# Patient Record
Sex: MALE | Race: WHITE | URBAN - NONMETROPOLITAN AREA
[De-identification: names, ages, dates, MRNs, and addresses within clinical notes are randomized per-mention and may not be internally consistent; named-entity substitution may affect disease eponyms.]

---

## 2018-03-01 ENCOUNTER — DOCTOR'S OFFICE (OUTPATIENT)
Dept: URBAN - NONMETROPOLITAN AREA CLINIC 1 | Facility: CLINIC | Age: 68
Setting detail: OPHTHALMOLOGY
End: 2018-03-01
Payer: COMMERCIAL

## 2018-03-01 DIAGNOSIS — H52.13: ICD-10-CM

## 2018-03-01 DIAGNOSIS — H52.4: ICD-10-CM

## 2018-03-01 PROCEDURE — 92004 COMPRE OPH EXAM NEW PT 1/>: CPT | Performed by: OPTOMETRIST

## 2018-03-01 ASSESSMENT — SPHEQUIV_DERIVED
OD_SPHEQUIV: -0.375
OS_SPHEQUIV: -1.25

## 2018-03-01 ASSESSMENT — REFRACTION_AUTOREFRACTION
OD_AXIS: 151
OD_SPHERE: 0.00
OS_CYLINDER: -0.50
OS_SPHERE: -1.00
OS_AXIS: 59
OD_CYLINDER: -0.75

## 2018-03-01 ASSESSMENT — VISUAL ACUITY
OS_BCVA: 20/25
OD_BCVA: 20/25-1

## 2018-03-01 ASSESSMENT — REFRACTION_CURRENTRX
OD_AXIS: 154
OD_SPHERE: -0.50
OD_CYLINDER: -1.50
OS_AXIS: 180
OS_OVR_VA: 20/
OS_VPRISM_DIRECTION: SV
OD_OVR_VA: 20/
OS_OVR_VA: 20/
OD_VPRISM_DIRECTION: SV
OD_OVR_VA: 20/
OS_SPHERE: -1.00
OS_OVR_VA: 20/
OD_OVR_VA: 20/
OS_CYLINDER: 0.00

## 2018-03-01 ASSESSMENT — REFRACTION_MANIFEST
OD_VA2: 20/
OD_VA2: 20/
OU_VA: 20/
OS_VA3: 20/
OS_VA2: 20/
OS_VA3: 20/
OD_VA1: 20/
OU_VA: 20/
OD_VA1: 20/
OD_VA3: 20/
OD_VA3: 20/
OS_VA1: 20/
OS_VA2: 20/
OS_VA1: 20/

## 2018-03-01 ASSESSMENT — REFRACTION_OUTSIDERX
OD_VA3: 20/
OD_SPHERE: -0.50
OS_VA2: 20/20
OD_ADD: +2.50
OS_ADD: +2.50
OD_AXIS: 155
OS_SPHERE: -1.00
OU_VA: 20/20
OS_VA3: 20/
OD_VA2: 20/20
OD_VA1: 20/20
OS_VA1: 20/20
OD_CYLINDER: -1.50

## 2018-03-01 ASSESSMENT — CONFRONTATIONAL VISUAL FIELD TEST (CVF)
OS_FINDINGS: FULL
OD_FINDINGS: FULL

## 2018-03-07 ENCOUNTER — OPTICAL OFFICE (OUTPATIENT)
Dept: URBAN - NONMETROPOLITAN AREA CLINIC 4 | Facility: CLINIC | Age: 68
Setting detail: OPHTHALMOLOGY
End: 2018-03-07
Payer: COMMERCIAL

## 2018-03-07 DIAGNOSIS — H52.13: ICD-10-CM

## 2018-03-07 PROCEDURE — V2025 EYEGLASSES DELUX FRAMES: HCPCS | Performed by: OPTOMETRIST

## 2018-03-07 PROCEDURE — V2103 SPHEROCYLINDR 4.00D/12-2.00D: HCPCS | Performed by: OPTOMETRIST

## 2018-03-07 PROCEDURE — V2020 VISION SVCS FRAMES PURCHASES: HCPCS | Performed by: OPTOMETRIST

## 2020-10-22 DIAGNOSIS — Z87.19 PERSONAL HISTORY OF OTHER DISEASES OF THE DIGESTIVE SYSTEM: ICD-10-CM

## 2020-10-22 DIAGNOSIS — Z98.890 OTHER SPECIFIED POSTPROCEDURAL STATES: ICD-10-CM

## 2020-10-22 DIAGNOSIS — R19.09 OTHER INTRA-ABDOMINAL AND PELVIC SWELLING, MASS AND LUMP: ICD-10-CM

## 2020-10-23 ENCOUNTER — TRANSCRIBE ORDERS (OUTPATIENT)
Dept: ADMINISTRATIVE | Facility: HOSPITAL | Age: 70
End: 2020-10-23

## 2020-10-23 DIAGNOSIS — R07.89 CHEST PRESSURE: Primary | ICD-10-CM

## 2020-10-28 ENCOUNTER — HOSPITAL ENCOUNTER (OUTPATIENT)
Dept: ULTRASOUND IMAGING | Facility: HOSPITAL | Age: 70
Discharge: HOME | End: 2020-10-28
Payer: MEDICARE

## 2020-10-28 DIAGNOSIS — R19.09 OTHER INTRA-ABDOMINAL AND PELVIC SWELLING, MASS AND LUMP: ICD-10-CM

## 2020-10-28 PROCEDURE — 76705 ECHO EXAM OF ABDOMEN: CPT

## 2020-11-12 ENCOUNTER — HOSPITAL ENCOUNTER (OUTPATIENT)
Dept: NON INVASIVE DIAGNOSTICS | Facility: HOSPITAL | Age: 70
Discharge: HOME | End: 2020-11-12
Payer: MEDICARE

## 2020-11-12 ENCOUNTER — DOCUMENTATION (OUTPATIENT)
Dept: NON INVASIVE DIAGNOSTICS | Facility: HOSPITAL | Age: 70
End: 2020-11-12

## 2020-11-12 DIAGNOSIS — R07.89 CHEST PRESSURE: ICD-10-CM

## 2020-11-12 LAB
CHEST PAIN STATEMENT: NORMAL
MAX DIASTOLIC BP: 88 MMHG
MAX HEART RATE: 136 BPM
MAX PREDICTED HEART RATE: 151 BPM
MAX. SYSTOLIC BP: 148 MMHG
PROTOCOL NAME: NORMAL
REASON FOR TERMINATION: NORMAL
TARGET HR FORMULA: NORMAL
TEST INDICATION: NORMAL
TIME IN EXERCISE PHASE: NORMAL

## 2020-11-12 PROCEDURE — 93351 STRESS TTE COMPLETE: CPT | Performed by: INTERNAL MEDICINE

## 2020-11-12 PROCEDURE — 93350 STRESS TTE ONLY: CPT

## 2020-11-12 RX ORDER — ASPIRIN 325 MG
325 TABLET ORAL DAILY
Status: DISCONTINUED | OUTPATIENT
Start: 2020-11-12 | End: 2020-11-13 | Stop reason: HOSPADM

## 2020-11-12 RX ORDER — CLOPIDOGREL BISULFATE 75 MG/1
75 TABLET ORAL DAILY
Status: DISCONTINUED | OUTPATIENT
Start: 2020-11-13 | End: 2020-11-13 | Stop reason: HOSPADM

## 2020-11-12 RX ORDER — CLOPIDOGREL BISULFATE 75 MG/1
300 TABLET ORAL ONCE
Status: DISCONTINUED | OUTPATIENT
Start: 2020-11-12 | End: 2020-11-13 | Stop reason: HOSPADM

## 2020-11-13 ENCOUNTER — TELEPHONE (OUTPATIENT)
Dept: CARDIOLOGY CLINIC | Facility: CLINIC | Age: 70
End: 2020-11-13

## 2020-11-13 DIAGNOSIS — R94.39 ABNORMAL STRESS TEST: Primary | ICD-10-CM

## 2020-11-16 ENCOUNTER — APPOINTMENT (OUTPATIENT)
Dept: LAB | Facility: HOSPITAL | Age: 70
End: 2020-11-16
Payer: MEDICARE

## 2020-11-16 DIAGNOSIS — R94.39 ABNORMAL STRESS TEST: Primary | ICD-10-CM

## 2020-11-16 LAB
ALBUMIN SERPL BCP-MCNC: 3.9 G/DL (ref 3.5–5)
ALP SERPL-CCNC: 59 U/L (ref 46–116)
ALT SERPL W P-5'-P-CCNC: 23 U/L (ref 12–78)
ANION GAP SERPL CALCULATED.3IONS-SCNC: 9 MMOL/L (ref 4–13)
AST SERPL W P-5'-P-CCNC: 16 U/L (ref 5–45)
BASOPHILS # BLD AUTO: 0.05 THOUSANDS/ΜL (ref 0–0.1)
BASOPHILS NFR BLD AUTO: 1 % (ref 0–1)
BILIRUB SERPL-MCNC: 0.56 MG/DL (ref 0.2–1)
BUN SERPL-MCNC: 16 MG/DL (ref 5–25)
CALCIUM SERPL-MCNC: 9.3 MG/DL (ref 8.3–10.1)
CHLORIDE SERPL-SCNC: 107 MMOL/L (ref 100–108)
CO2 SERPL-SCNC: 28 MMOL/L (ref 21–32)
CREAT SERPL-MCNC: 1.12 MG/DL (ref 0.6–1.3)
EOSINOPHIL # BLD AUTO: 0.38 THOUSAND/ΜL (ref 0–0.61)
EOSINOPHIL NFR BLD AUTO: 8 % (ref 0–6)
ERYTHROCYTE [DISTWIDTH] IN BLOOD BY AUTOMATED COUNT: 13 % (ref 11.6–15.1)
GFR SERPL CREATININE-BSD FRML MDRD: 67 ML/MIN/1.73SQ M
GLUCOSE P FAST SERPL-MCNC: 106 MG/DL (ref 65–99)
HCT VFR BLD AUTO: 46 % (ref 36.5–49.3)
HGB BLD-MCNC: 15 G/DL (ref 12–17)
IMM GRANULOCYTES # BLD AUTO: 0 THOUSAND/UL (ref 0–0.2)
IMM GRANULOCYTES NFR BLD AUTO: 0 % (ref 0–2)
LYMPHOCYTES # BLD AUTO: 1.28 THOUSANDS/ΜL (ref 0.6–4.47)
LYMPHOCYTES NFR BLD AUTO: 28 % (ref 14–44)
MCH RBC QN AUTO: 29.1 PG (ref 26.8–34.3)
MCHC RBC AUTO-ENTMCNC: 32.6 G/DL (ref 31.4–37.4)
MCV RBC AUTO: 89 FL (ref 82–98)
MONOCYTES # BLD AUTO: 0.51 THOUSAND/ΜL (ref 0.17–1.22)
MONOCYTES NFR BLD AUTO: 11 % (ref 4–12)
NEUTROPHILS # BLD AUTO: 2.44 THOUSANDS/ΜL (ref 1.85–7.62)
NEUTS SEG NFR BLD AUTO: 52 % (ref 43–75)
NRBC BLD AUTO-RTO: 0 /100 WBCS
PLATELET # BLD AUTO: 226 THOUSANDS/UL (ref 149–390)
PMV BLD AUTO: 10.8 FL (ref 8.9–12.7)
POTASSIUM SERPL-SCNC: 4.4 MMOL/L (ref 3.5–5.3)
PROT SERPL-MCNC: 7.1 G/DL (ref 6.4–8.2)
RBC # BLD AUTO: 5.16 MILLION/UL (ref 3.88–5.62)
SODIUM SERPL-SCNC: 144 MMOL/L (ref 136–145)
WBC # BLD AUTO: 4.66 THOUSAND/UL (ref 4.31–10.16)

## 2020-11-16 PROCEDURE — 85025 COMPLETE CBC W/AUTO DIFF WBC: CPT

## 2020-11-16 PROCEDURE — 80053 COMPREHEN METABOLIC PANEL: CPT

## 2020-11-16 PROCEDURE — 36415 COLL VENOUS BLD VENIPUNCTURE: CPT

## 2020-11-16 RX ORDER — CLOPIDOGREL 300 MG/1
300 TABLET, FILM COATED ORAL ONCE
Qty: 1 TABLET | Refills: 0
Start: 2020-11-16 | End: 2021-01-13 | Stop reason: CLARIF

## 2020-11-16 RX ORDER — CLOPIDOGREL BISULFATE 75 MG/1
75 TABLET ORAL DAILY
Qty: 10 TABLET | Refills: 3
Start: 2020-11-16 | End: 2020-11-20 | Stop reason: HOSPADM

## 2020-11-18 ENCOUNTER — TELEPHONE (OUTPATIENT)
Dept: SURGERY | Facility: HOSPITAL | Age: 70
End: 2020-11-18

## 2020-11-18 RX ORDER — SODIUM CHLORIDE 9 MG/ML
100 INJECTION, SOLUTION INTRAVENOUS CONTINUOUS
Status: CANCELLED | OUTPATIENT
Start: 2020-11-18

## 2020-11-19 ENCOUNTER — HOSPITAL ENCOUNTER (OUTPATIENT)
Dept: NON INVASIVE DIAGNOSTICS | Facility: HOSPITAL | Age: 70
Discharge: HOME | End: 2020-11-20
Attending: INTERNAL MEDICINE | Admitting: INTERNAL MEDICINE
Payer: MEDICARE

## 2020-11-19 DIAGNOSIS — R94.39 ABNORMAL STRESS TEST: ICD-10-CM

## 2020-11-19 DIAGNOSIS — I25.10 CORONARY ARTERY DISEASE INVOLVING NATIVE CORONARY ARTERY: Primary | ICD-10-CM

## 2020-11-19 DIAGNOSIS — Z98.61 S/P PTCA (PERCUTANEOUS TRANSLUMINAL CORONARY ANGIOPLASTY): ICD-10-CM

## 2020-11-19 PROBLEM — E78.5 DYSLIPIDEMIA: Status: ACTIVE | Noted: 2020-11-19

## 2020-11-19 LAB
ATRIAL RATE: 60 BPM
KCT BLD-ACNC: 286 SEC (ref 89–137)
P AXIS: 16 DEGREES
PR INTERVAL: 138 MS
QRS AXIS: 15 DEGREES
QRSD INTERVAL: 80 MS
QT INTERVAL: 406 MS
QTC INTERVAL: 406 MS
SPECIMEN SOURCE: ABNORMAL
T WAVE AXIS: 42 DEGREES
VENTRICULAR RATE: 60 BPM

## 2020-11-19 PROCEDURE — C1725 CATH, TRANSLUMIN NON-LASER: HCPCS | Performed by: INTERNAL MEDICINE

## 2020-11-19 PROCEDURE — C1769 GUIDE WIRE: HCPCS | Performed by: INTERNAL MEDICINE

## 2020-11-19 PROCEDURE — C1894 INTRO/SHEATH, NON-LASER: HCPCS | Performed by: INTERNAL MEDICINE

## 2020-11-19 PROCEDURE — 93005 ELECTROCARDIOGRAM TRACING: CPT

## 2020-11-19 PROCEDURE — 93454 CORONARY ARTERY ANGIO S&I: CPT | Performed by: INTERNAL MEDICINE

## 2020-11-19 PROCEDURE — 99152 MOD SED SAME PHYS/QHP 5/>YRS: CPT | Performed by: INTERNAL MEDICINE

## 2020-11-19 PROCEDURE — C9600 PERC DRUG-EL COR STENT SING: HCPCS | Performed by: INTERNAL MEDICINE

## 2020-11-19 PROCEDURE — C1874 STENT, COATED/COV W/DEL SYS: HCPCS

## 2020-11-19 PROCEDURE — NC001 PR NO CHARGE: Performed by: INTERNAL MEDICINE

## 2020-11-19 PROCEDURE — C1887 CATHETER, GUIDING: HCPCS | Performed by: INTERNAL MEDICINE

## 2020-11-19 PROCEDURE — 85347 COAGULATION TIME ACTIVATED: CPT

## 2020-11-19 PROCEDURE — 93010 ELECTROCARDIOGRAM REPORT: CPT | Performed by: INTERNAL MEDICINE

## 2020-11-19 PROCEDURE — 99153 MOD SED SAME PHYS/QHP EA: CPT | Performed by: INTERNAL MEDICINE

## 2020-11-19 RX ORDER — CLOPIDOGREL BISULFATE 75 MG/1
75 TABLET ORAL DAILY
Status: DISCONTINUED | OUTPATIENT
Start: 2020-11-19 | End: 2020-11-20 | Stop reason: HOSPADM

## 2020-11-19 RX ORDER — PAROXETINE 10 MG/1
5 TABLET, FILM COATED ORAL DAILY
COMMUNITY
End: 2022-04-29

## 2020-11-19 RX ORDER — METOPROLOL SUCCINATE 25 MG/1
25 TABLET, EXTENDED RELEASE ORAL DAILY
Status: DISCONTINUED | OUTPATIENT
Start: 2020-11-19 | End: 2020-11-20 | Stop reason: HOSPADM

## 2020-11-19 RX ORDER — VERAPAMIL HYDROCHLORIDE 2.5 MG/ML
INJECTION, SOLUTION INTRAVENOUS CODE/TRAUMA/SEDATION MEDICATION
Status: COMPLETED | OUTPATIENT
Start: 2020-11-19 | End: 2020-11-19

## 2020-11-19 RX ORDER — ATORVASTATIN CALCIUM 40 MG/1
40 TABLET, FILM COATED ORAL
Status: DISCONTINUED | OUTPATIENT
Start: 2020-11-19 | End: 2020-11-20 | Stop reason: HOSPADM

## 2020-11-19 RX ORDER — SODIUM CHLORIDE 9 MG/ML
100 INJECTION, SOLUTION INTRAVENOUS CONTINUOUS
Status: DISPENSED | OUTPATIENT
Start: 2020-11-19 | End: 2020-11-19

## 2020-11-19 RX ORDER — NITROGLYCERIN 0.4 MG/1
0.4 TABLET SUBLINGUAL
COMMUNITY
End: 2021-07-13

## 2020-11-19 RX ORDER — CLOPIDOGREL BISULFATE 75 MG/1
300 TABLET ORAL ONCE
Status: COMPLETED | OUTPATIENT
Start: 2020-11-19 | End: 2020-11-19

## 2020-11-19 RX ORDER — TAMSULOSIN HYDROCHLORIDE 0.4 MG/1
0.4 CAPSULE ORAL
COMMUNITY

## 2020-11-19 RX ORDER — NITROGLYCERIN 20 MG/100ML
INJECTION INTRAVENOUS CODE/TRAUMA/SEDATION MEDICATION
Status: COMPLETED | OUTPATIENT
Start: 2020-11-19 | End: 2020-11-19

## 2020-11-19 RX ORDER — SODIUM CHLORIDE 9 MG/ML
100 INJECTION, SOLUTION INTRAVENOUS CONTINUOUS
Status: DISCONTINUED | OUTPATIENT
Start: 2020-11-19 | End: 2020-11-19

## 2020-11-19 RX ORDER — HEPARIN SODIUM 1000 [USP'U]/ML
INJECTION, SOLUTION INTRAVENOUS; SUBCUTANEOUS CODE/TRAUMA/SEDATION MEDICATION
Status: COMPLETED | OUTPATIENT
Start: 2020-11-19 | End: 2020-11-19

## 2020-11-19 RX ORDER — PAROXETINE 10 MG/1
10 TABLET, FILM COATED ORAL DAILY
Status: DISCONTINUED | OUTPATIENT
Start: 2020-11-19 | End: 2020-11-20 | Stop reason: HOSPADM

## 2020-11-19 RX ORDER — CLOPIDOGREL BISULFATE 75 MG/1
TABLET ORAL CODE/TRAUMA/SEDATION MEDICATION
Status: COMPLETED | OUTPATIENT
Start: 2020-11-19 | End: 2020-11-19

## 2020-11-19 RX ORDER — ASPIRIN 81 MG/1
TABLET, CHEWABLE ORAL CODE/TRAUMA/SEDATION MEDICATION
Status: COMPLETED | OUTPATIENT
Start: 2020-11-19 | End: 2020-11-19

## 2020-11-19 RX ORDER — LIDOCAINE HYDROCHLORIDE 10 MG/ML
INJECTION, SOLUTION EPIDURAL; INFILTRATION; INTRACAUDAL; PERINEURAL CODE/TRAUMA/SEDATION MEDICATION
Status: COMPLETED | OUTPATIENT
Start: 2020-11-19 | End: 2020-11-19

## 2020-11-19 RX ORDER — ASPIRIN 81 MG/1
81 TABLET, CHEWABLE ORAL DAILY
Status: DISCONTINUED | OUTPATIENT
Start: 2020-11-19 | End: 2020-11-20 | Stop reason: HOSPADM

## 2020-11-19 RX ORDER — TAMSULOSIN HYDROCHLORIDE 0.4 MG/1
0.4 CAPSULE ORAL
Status: DISCONTINUED | OUTPATIENT
Start: 2020-11-19 | End: 2020-11-20 | Stop reason: HOSPADM

## 2020-11-19 RX ADMIN — NITROGLYCERIN 200 MCG: 20 INJECTION INTRAVENOUS at 09:39

## 2020-11-19 RX ADMIN — ASPIRIN 81 MG CHEWABLE TABLET 81 MG: 81 TABLET CHEWABLE at 17:09

## 2020-11-19 RX ADMIN — TAMSULOSIN HYDROCHLORIDE 0.4 MG: 0.4 CAPSULE ORAL at 17:09

## 2020-11-19 RX ADMIN — ATORVASTATIN CALCIUM 40 MG: 40 TABLET, FILM COATED ORAL at 17:09

## 2020-11-19 RX ADMIN — PAROXETINE 10 MG: 10 TABLET, FILM COATED ORAL at 17:08

## 2020-11-19 RX ADMIN — METOPROLOL SUCCINATE 25 MG: 25 TABLET, EXTENDED RELEASE ORAL at 17:09

## 2020-11-19 RX ADMIN — SODIUM CHLORIDE 100 ML/HR: 0.9 INJECTION, SOLUTION INTRAVENOUS at 08:48

## 2020-11-19 RX ADMIN — LIDOCAINE HYDROCHLORIDE 1 ML: 10 INJECTION, SOLUTION EPIDURAL; INFILTRATION; INTRACAUDAL; PERINEURAL at 09:38

## 2020-11-19 RX ADMIN — HEPARIN SODIUM 6000 UNITS: 1000 INJECTION INTRAVENOUS; SUBCUTANEOUS at 09:47

## 2020-11-19 RX ADMIN — IOHEXOL 100 ML: 350 INJECTION, SOLUTION INTRAVENOUS at 09:59

## 2020-11-19 RX ADMIN — ASPIRIN 81 MG CHEWABLE TABLET 324 MG: 81 TABLET CHEWABLE at 09:27

## 2020-11-19 RX ADMIN — SODIUM CHLORIDE 100 ML/HR: 0.9 INJECTION, SOLUTION INTRAVENOUS at 10:30

## 2020-11-19 RX ADMIN — HEPARIN SODIUM 4000 UNITS: 1000 INJECTION INTRAVENOUS; SUBCUTANEOUS at 09:39

## 2020-11-19 RX ADMIN — CLOPIDOGREL 300 MG: 75 TABLET, FILM COATED ORAL at 09:54

## 2020-11-19 RX ADMIN — VERAPAMIL HYDROCHLORIDE 2.5 MG: 2.5 INJECTION, SOLUTION INTRAVENOUS at 09:39

## 2020-11-19 RX ADMIN — CLOPIDOGREL BISULFATE 300 MG: 75 TABLET ORAL at 08:32

## 2020-11-20 VITALS
HEART RATE: 66 BPM | SYSTOLIC BLOOD PRESSURE: 147 MMHG | BODY MASS INDEX: 27.68 KG/M2 | WEIGHT: 204.37 LBS | OXYGEN SATURATION: 97 % | TEMPERATURE: 97 F | DIASTOLIC BLOOD PRESSURE: 75 MMHG | HEIGHT: 72 IN | RESPIRATION RATE: 18 BRPM

## 2020-11-20 LAB
ANION GAP SERPL CALCULATED.3IONS-SCNC: 8 MMOL/L (ref 4–13)
BUN SERPL-MCNC: 17 MG/DL (ref 5–25)
CALCIUM SERPL-MCNC: 9 MG/DL (ref 8.3–10.1)
CHLORIDE SERPL-SCNC: 107 MMOL/L (ref 100–108)
CHOLEST SERPL-MCNC: 176 MG/DL (ref 50–200)
CO2 SERPL-SCNC: 26 MMOL/L (ref 21–32)
CREAT SERPL-MCNC: 1.04 MG/DL (ref 0.6–1.3)
ERYTHROCYTE [DISTWIDTH] IN BLOOD BY AUTOMATED COUNT: 13 % (ref 11.6–15.1)
GFR SERPL CREATININE-BSD FRML MDRD: 73 ML/MIN/1.73SQ M
GLUCOSE SERPL-MCNC: 96 MG/DL (ref 65–140)
HCT VFR BLD AUTO: 42.1 % (ref 36.5–49.3)
HDLC SERPL-MCNC: 51 MG/DL
HGB BLD-MCNC: 13.8 G/DL (ref 12–17)
LDLC SERPL CALC-MCNC: 99 MG/DL (ref 0–100)
MCH RBC QN AUTO: 29.4 PG (ref 26.8–34.3)
MCHC RBC AUTO-ENTMCNC: 32.8 G/DL (ref 31.4–37.4)
MCV RBC AUTO: 90 FL (ref 82–98)
PLATELET # BLD AUTO: 199 THOUSANDS/UL (ref 149–390)
PMV BLD AUTO: 10.2 FL (ref 8.9–12.7)
POTASSIUM SERPL-SCNC: 4 MMOL/L (ref 3.5–5.3)
RBC # BLD AUTO: 4.69 MILLION/UL (ref 3.88–5.62)
SODIUM SERPL-SCNC: 141 MMOL/L (ref 136–145)
TRIGL SERPL-MCNC: 128 MG/DL
WBC # BLD AUTO: 6.59 THOUSAND/UL (ref 4.31–10.16)

## 2020-11-20 PROCEDURE — 85027 COMPLETE CBC AUTOMATED: CPT | Performed by: INTERNAL MEDICINE

## 2020-11-20 PROCEDURE — 80048 BASIC METABOLIC PNL TOTAL CA: CPT | Performed by: INTERNAL MEDICINE

## 2020-11-20 PROCEDURE — 80061 LIPID PANEL: CPT | Performed by: INTERNAL MEDICINE

## 2020-11-20 PROCEDURE — NC001 PR NO CHARGE: Performed by: INTERNAL MEDICINE

## 2020-11-20 RX ORDER — METOPROLOL SUCCINATE 25 MG/1
25 TABLET, EXTENDED RELEASE ORAL
Qty: 30 TABLET | Refills: 0 | Status: SHIPPED | OUTPATIENT
Start: 2020-11-20 | End: 2021-03-23

## 2020-11-20 RX ORDER — ATORVASTATIN CALCIUM 40 MG/1
40 TABLET, FILM COATED ORAL DAILY
Qty: 30 TABLET | Refills: 10 | Status: SHIPPED | OUTPATIENT
Start: 2020-12-16 | End: 2021-01-13 | Stop reason: CLARIF

## 2020-11-20 RX ORDER — CLOPIDOGREL BISULFATE 75 MG/1
75 TABLET ORAL DAILY
Qty: 30 TABLET | Refills: 0 | Status: SHIPPED | OUTPATIENT
Start: 2020-11-20 | End: 2021-07-13 | Stop reason: SDUPTHER

## 2020-11-20 RX ORDER — METOPROLOL SUCCINATE 50 MG/1
50 TABLET, EXTENDED RELEASE ORAL EVERY 12 HOURS
Status: DISCONTINUED | OUTPATIENT
Start: 2020-11-20 | End: 2020-11-20 | Stop reason: HOSPADM

## 2020-11-20 RX ORDER — METOPROLOL SUCCINATE 25 MG/1
25 TABLET, EXTENDED RELEASE ORAL DAILY
Qty: 30 TABLET | Refills: 10 | Status: SHIPPED | OUTPATIENT
Start: 2020-12-16 | End: 2021-01-13 | Stop reason: CLARIF

## 2020-11-20 RX ORDER — CLOPIDOGREL BISULFATE 75 MG/1
75 TABLET ORAL DAILY
Qty: 30 TABLET | Refills: 10 | Status: SHIPPED | OUTPATIENT
Start: 2020-12-16 | End: 2021-01-13 | Stop reason: CLARIF

## 2020-11-20 RX ORDER — ATORVASTATIN CALCIUM 40 MG/1
40 TABLET, FILM COATED ORAL
Qty: 30 TABLET | Refills: 0 | Status: SHIPPED | OUTPATIENT
Start: 2020-11-20 | End: 2021-07-13 | Stop reason: SDUPTHER

## 2020-11-20 RX ORDER — ASPIRIN 81 MG/1
81 TABLET, CHEWABLE ORAL DAILY
Refills: 0
Start: 2020-11-20 | End: 2021-01-13 | Stop reason: CLARIF

## 2020-11-20 RX ADMIN — PAROXETINE 10 MG: 10 TABLET, FILM COATED ORAL at 09:50

## 2020-11-20 RX ADMIN — METOPROLOL SUCCINATE 25 MG: 25 TABLET, EXTENDED RELEASE ORAL at 09:51

## 2020-11-20 RX ADMIN — METOPROLOL SUCCINATE 50 MG: 50 TABLET, EXTENDED RELEASE ORAL at 09:51

## 2020-11-20 RX ADMIN — ASPIRIN 81 MG CHEWABLE TABLET 81 MG: 81 TABLET CHEWABLE at 09:51

## 2020-11-20 RX ADMIN — CLOPIDOGREL BISULFATE 75 MG: 75 TABLET ORAL at 09:51

## 2020-11-24 ENCOUNTER — OFFICE VISIT (OUTPATIENT)
Dept: CARDIOLOGY CLINIC | Facility: HOSPITAL | Age: 70
End: 2020-11-24
Payer: MEDICARE

## 2020-11-24 VITALS
SYSTOLIC BLOOD PRESSURE: 130 MMHG | HEART RATE: 76 BPM | BODY MASS INDEX: 27.66 KG/M2 | HEIGHT: 72 IN | WEIGHT: 204.2 LBS | DIASTOLIC BLOOD PRESSURE: 80 MMHG

## 2020-11-24 DIAGNOSIS — I25.10 CORONARY ARTERY DISEASE INVOLVING NATIVE CORONARY ARTERY OF NATIVE HEART WITHOUT ANGINA PECTORIS: Primary | ICD-10-CM

## 2020-11-24 DIAGNOSIS — E78.5 DYSLIPIDEMIA: ICD-10-CM

## 2020-11-24 DIAGNOSIS — Z95.5 S/P DRUG ELUTING CORONARY STENT PLACEMENT: ICD-10-CM

## 2020-11-24 PROCEDURE — 99214 OFFICE O/P EST MOD 30 MIN: CPT | Performed by: PHYSICIAN ASSISTANT

## 2021-01-13 ENCOUNTER — OFFICE VISIT (OUTPATIENT)
Dept: CARDIOLOGY CLINIC | Facility: CLINIC | Age: 71
End: 2021-01-13
Payer: MEDICARE

## 2021-01-13 VITALS
HEART RATE: 67 BPM | BODY MASS INDEX: 27.63 KG/M2 | TEMPERATURE: 97.4 F | WEIGHT: 204 LBS | HEIGHT: 72 IN | DIASTOLIC BLOOD PRESSURE: 80 MMHG | SYSTOLIC BLOOD PRESSURE: 138 MMHG

## 2021-01-13 DIAGNOSIS — Z95.5 S/P CORONARY ARTERY STENT PLACEMENT: ICD-10-CM

## 2021-01-13 DIAGNOSIS — R03.0 ELEVATED BLOOD-PRESSURE READING, WITHOUT DIAGNOSIS OF HYPERTENSION: ICD-10-CM

## 2021-01-13 DIAGNOSIS — E78.5 DYSLIPIDEMIA: ICD-10-CM

## 2021-01-13 DIAGNOSIS — I25.10 CORONARY ARTERY DISEASE INVOLVING NATIVE CORONARY ARTERY OF NATIVE HEART WITHOUT ANGINA PECTORIS: Primary | ICD-10-CM

## 2021-01-13 PROCEDURE — 99214 OFFICE O/P EST MOD 30 MIN: CPT | Performed by: INTERNAL MEDICINE

## 2021-01-13 RX ORDER — AMOXICILLIN 500 MG
CAPSULE ORAL DAILY
COMMUNITY
End: 2022-07-13 | Stop reason: SDUPTHER

## 2021-01-13 RX ORDER — ASPIRIN 81 MG/1
81 TABLET ORAL DAILY
COMMUNITY

## 2021-01-13 NOTE — PATIENT INSTRUCTIONS
Continue current medications  Monitor blood pressure intermittently  Call me with any questions   Lipid panel prior to next office visit  Echocardiogram to assess heart structure and function

## 2021-02-01 ENCOUNTER — TELEPHONE (OUTPATIENT)
Dept: CARDIOLOGY CLINIC | Facility: CLINIC | Age: 71
End: 2021-02-01

## 2021-02-01 NOTE — TELEPHONE ENCOUNTER
I received a message from the patient asking for a phone call to re- schedule a canceled bp check  I called the pt and LMOM asking him to call back

## 2021-02-03 ENCOUNTER — TELEPHONE (OUTPATIENT)
Dept: CARDIOLOGY CLINIC | Facility: CLINIC | Age: 71
End: 2021-02-03

## 2021-02-10 ENCOUNTER — HOSPITAL ENCOUNTER (OUTPATIENT)
Dept: NON INVASIVE DIAGNOSTICS | Facility: HOSPITAL | Age: 71
Discharge: HOME | End: 2021-02-10
Attending: INTERNAL MEDICINE
Payer: MEDICARE

## 2021-02-10 DIAGNOSIS — R03.0 ELEVATED BLOOD-PRESSURE READING, WITHOUT DIAGNOSIS OF HYPERTENSION: ICD-10-CM

## 2021-02-10 DIAGNOSIS — I25.10 CORONARY ARTERY DISEASE INVOLVING NATIVE CORONARY ARTERY OF NATIVE HEART WITHOUT ANGINA PECTORIS: ICD-10-CM

## 2021-02-10 PROCEDURE — 93306 TTE W/DOPPLER COMPLETE: CPT

## 2021-02-12 ENCOUNTER — TELEPHONE (OUTPATIENT)
Dept: CARDIOLOGY CLINIC | Facility: CLINIC | Age: 71
End: 2021-02-12

## 2021-02-12 NOTE — TELEPHONE ENCOUNTER
----- Message from [facility], DO sent at 2/11/2021  3:23 PM EST -----  Please call the patient let him know that his echocardiogram showed normal heart size and normal heart function without any significant abnormalities and that we will discuss details at follow-up  Thank you

## 2021-02-12 NOTE — TELEPHONE ENCOUNTER
----- Message from [facility], DO sent at 2/11/2021  3:23 PM EST -----  Please call the patient let him know that his echocardiogram showed normal heart size and normal heart function without any significant abnormalities and that we will discuss details at follow-up  Thank you

## 2021-03-10 DIAGNOSIS — Z23 ENCOUNTER FOR IMMUNIZATION: ICD-10-CM

## 2021-03-16 ENCOUNTER — APPOINTMENT (OUTPATIENT)
Dept: LAB | Facility: HOSPITAL | Age: 71
End: 2021-03-16
Attending: INTERNAL MEDICINE
Payer: MEDICARE

## 2021-03-16 DIAGNOSIS — E78.5 DYSLIPIDEMIA: ICD-10-CM

## 2021-03-16 DIAGNOSIS — I25.10 CORONARY ARTERY DISEASE INVOLVING NATIVE CORONARY ARTERY OF NATIVE HEART WITHOUT ANGINA PECTORIS: ICD-10-CM

## 2021-03-16 LAB
CHOLEST SERPL-MCNC: 105 MG/DL (ref 50–200)
HDLC SERPL-MCNC: 58 MG/DL
LDLC SERPL CALC-MCNC: 31 MG/DL (ref 0–100)
NONHDLC SERPL-MCNC: 47 MG/DL
TRIGL SERPL-MCNC: 78 MG/DL

## 2021-03-16 PROCEDURE — 36415 COLL VENOUS BLD VENIPUNCTURE: CPT

## 2021-03-16 PROCEDURE — 80061 LIPID PANEL: CPT

## 2021-03-23 ENCOUNTER — OFFICE VISIT (OUTPATIENT)
Dept: CARDIOLOGY CLINIC | Facility: CLINIC | Age: 71
End: 2021-03-23
Payer: MEDICARE

## 2021-03-23 VITALS
BODY MASS INDEX: 27.93 KG/M2 | HEART RATE: 57 BPM | SYSTOLIC BLOOD PRESSURE: 142 MMHG | OXYGEN SATURATION: 98 % | HEIGHT: 72 IN | DIASTOLIC BLOOD PRESSURE: 88 MMHG | TEMPERATURE: 97.8 F | WEIGHT: 206.2 LBS

## 2021-03-23 DIAGNOSIS — R55 NEAR SYNCOPE: ICD-10-CM

## 2021-03-23 DIAGNOSIS — R03.0 ELEVATED BLOOD-PRESSURE READING, WITHOUT DIAGNOSIS OF HYPERTENSION: ICD-10-CM

## 2021-03-23 DIAGNOSIS — R00.1 BRADYCARDIA: ICD-10-CM

## 2021-03-23 DIAGNOSIS — I25.10 CORONARY ARTERY DISEASE INVOLVING NATIVE CORONARY ARTERY OF NATIVE HEART WITHOUT ANGINA PECTORIS: Primary | ICD-10-CM

## 2021-03-23 DIAGNOSIS — E78.5 DYSLIPIDEMIA: ICD-10-CM

## 2021-03-23 DIAGNOSIS — Z98.61 S/P PTCA (PERCUTANEOUS TRANSLUMINAL CORONARY ANGIOPLASTY): ICD-10-CM

## 2021-03-23 PROCEDURE — 99214 OFFICE O/P EST MOD 30 MIN: CPT | Performed by: INTERNAL MEDICINE

## 2021-03-23 PROCEDURE — 93000 ELECTROCARDIOGRAM COMPLETE: CPT | Performed by: INTERNAL MEDICINE

## 2021-03-23 RX ORDER — METOPROLOL SUCCINATE 25 MG/1
12.5 TABLET, EXTENDED RELEASE ORAL
Qty: 30 TABLET | Refills: 0
Start: 2021-03-23 | End: 2021-07-13 | Stop reason: SDUPTHER

## 2021-03-23 NOTE — PROGRESS NOTES
Cardiology Office Visit    Sandra Larose  [id number]  1950    [facility]  [address] [facility] [address]  [address]      Dear Lona Johnson MD,    I had the pleasure of seeing your patient at our [address] [facility] [place] office today 1/13/2021  As you know he is a pleasant 72y o  year old male with a medical history as described below  Reason for office visit: Establish care for coronary artery disease s/p LAD stent and hyperlipidemia  1  Coronary artery disease involving native coronary artery of native heart without angina pectoris  Assessment & Plan:  Coronary Artery Disease:   A  Abnormal stress echocardiogram 11/12/2020 with LAD wall motion abnormality  B  Cardiac catheterization 11/19/2020 showed a 99% stenosed mid LAD lesion which was stented with BLANCA  C  Residual 50% LCx(d) and 50% RtPDA disease  Patient is doing well post stenting  He denies any recurrent chest pain  He has resumed normal activities  Patient declined cardiac rehabilitation as he has been very active since his discharge without limitation  We discussed the importance of DAPT for at least 1 year with aspirin and clopidogrel  We discussed that no one should stop these medications except cardiology  Aspirin will be lifelong  Will plan echocardiogram to assess heart structure and function  Continue metoprolol succinate 25 mg daily  Continue atorvastatin 40 mg daily  Goal LDL < 70  Orders:  -     POCT ECG  -     Echo complete with contrast if indicated; Future; Expected date: 01/13/2021  -     Lipid panel; Future    2  S/P coronary artery stent placement    3  Dyslipidemia  Assessment & Plan:  Lipid panel 11/20/2020: C 176  T 128  H 51  L 99  Continue atorvastatin 40 mg daily  Lipid panel prior to follow up  Goal LDL < 70  Orders:  -     Lipid panel; Future    4   Elevated blood-pressure reading, without diagnosis of hypertension  Assessment & Plan:  Patient denies any history of hypertension however does note intermittent spikes in blood pressure  Blood pressure initially elevated but improved on my personal recheck  Patient is currently on metoprolol succinate 25 mg daily for cardiovascular protection  If blood pressure remains elevated we will need to consider addition of blood pressure medication such as losartan  Patient was asked to keep a log of his blood pressures and bring them to his follow-up visit  Orders:  -     Echo complete with contrast if indicated; Future; Expected date: 01/13/2021           HPI   Patient with prior history of hypertension and dyslipidemia which was intermittently treated  Patient had noted onset of chest pain with radiation to his neck and the right side of his jaw primarily with exertion  Stress echocardiogram was done 11/12/2020 which is notable for reproduction of symptoms of anginal chest pain  Frequent PVCs were noted and the patient was noted to have hypokinesis in the LAD territory  Patient underwent elective cardiac catheterization 11/19/2020  Patient was noted to have a 99% mid LAD stenosis just after the 1st diagonal   This was treated with PCI /BLANCA  Residual nonobstructive disease was noted in the distal left circumflex and PDA  He was discharged home on aspirin, clopidogrel, metoprolol succinate and atorvastatin  Patient was seen at [address] [place] [address] 11/24/2020 at which time he was doing well  He declined cardiac rehabilitation as he was very active  He wanted to transfer to cardiology closer to home  1/13/2021: Patient presents to the office today to establish care  In general he has been feeling well  He has had intermittent spikes in his blood pressure since his discharge  He also reports episodes of lightheadedness which seem to occur 1 blood pressure is low  He denies any recurrent chest pain  He denies any palpitations    He remains active at home without limitation  He has been taking his medications as prescribed  Both he and his wife transition to a more plant based diet  Patient Active Problem List   Diagnosis    Coronary artery disease involving native coronary artery    S/P coronary artery stent placement    Abnormal stress test    Dyslipidemia    Elevated blood-pressure reading, without diagnosis of hypertension     Past Medical History:   Diagnosis Date    Abnormal stress test     Coronary artery disease     BLANCA to LAD(m) 11/19/2020   Depression     Dyslipidemia 11/19/2020    History of BPH     Inguinal hernia     Left      Social History     Socioeconomic History    Marital status: /Civil Union     Spouse name: Not on file    Number of children: Not on file    Years of education: Not on file    Highest education level: Not on file   Occupational History    Occupation: Retired   Social Needs    Financial resource strain: Not on file    Food insecurity     Worry: Not on file     Inability: Not on file   [employer] needs     Medical: Not on file     Non-medical: Not on file   Tobacco Use    Smoking status: Never Smoker    Smokeless tobacco: Never Used   Substance and Sexual Activity    Alcohol use:  Yes     Alcohol/week: 1 0 standard drinks     Types: 1 Glasses of wine per week     Frequency: Monthly or less     Comment: socially     Drug use: Never    Sexual activity: Yes     Partners: Female   Lifestyle    Physical activity     Days per week: Not on file     Minutes per session: Not on file    Stress: Not on file   Relationships    Social connections     Talks on phone: Not on file     Gets together: Not on file     Attends [employer] service: Not on file     Active member of club or organization: Not on file     Attends meetings of clubs or organizations: Not on file     Relationship status: Not on file    Intimate partner violence     Fear of current or ex partner: Not on file Emotionally abused: Not on file     Physically abused: Not on file     Forced sexual activity: Not on file   Other Topics Concern    Not on file   Social History Narrative    Not on file      Family History   Problem Relation Age of Onset    Breast cancer Mother          at age 67   [facility] No Known Problems Father     No Known Problems Sister     No Known Problems Brother     No Known Problems Brother     No Known Problems Brother      Past Surgical History:   Procedure Laterality Date    CARDIAC CATHETERIZATION  2020    99% LAD(m) after D1  Residual 50% LCx(d) and 50% RtPDA   CORONARY ANGIOPLASTY WITH STENT PLACEMENT  2020    BLANCA to LAD(m)   LUMBAR DISC SURGERY      WRIST SURGERY Right        Current Outpatient Medications:     aspirin (ECOTRIN LOW STRENGTH) 81 mg EC tablet, Take 81 mg by mouth daily, Disp: , Rfl:     atorvastatin (LIPITOR) 40 mg tablet, Take 1 tablet (40 mg total) by mouth daily with dinner, Disp: 30 tablet, Rfl: 0    clopidogrel (PLAVIX) 75 mg tablet, Take 1 tablet (75 mg total) by mouth daily, Disp: 30 tablet, Rfl: 0    nitroglycerin (NITROSTAT) 0 4 mg SL tablet, Place 0 4 mg under the tongue every 5 (five) minutes as needed for chest pain, Disp: , Rfl:     Omega-3 Fatty Acids (Fish Oil) 1200 MG CAPS, Fish Oil, Disp: , Rfl:     PARoxetine (Paxil) 10 mg tablet, Take 10 mg by mouth daily, Disp: , Rfl:     tamsulosin (Flomax) 0 4 mg, Take 0 4 mg by mouth daily with dinner, Disp: , Rfl:     metoprolol succinate (TOPROL-XL) 25 mg 24 hr tablet, Take 0 5 tablets (12 5 mg total) by mouth daily at bedtime, Disp: 30 tablet, Rfl: 0  Allergies   Allergen Reactions    Amoxicillin Rash       Cardiac Testing:    EKG 2021: Normal sinus rhythm  Nonspecific ST abnormality  Lipid panel 2020: C 176  T 128  H 51  L 99  Review of Systems:    Review of Systems   Constitutional: Negative for activity change, appetite change and fatigue     HENT: Negative for congestion, hearing loss, tinnitus and trouble swallowing  Eyes: Negative for visual disturbance  Respiratory: Negative for cough, chest tightness, shortness of breath and wheezing  Cardiovascular: Negative for chest pain, palpitations and leg swelling  Gastrointestinal: Negative for abdominal distention, abdominal pain, nausea and vomiting  Genitourinary: Negative for difficulty urinating  Musculoskeletal: Negative for arthralgias  Skin: Negative for rash  Neurological: Positive for light-headedness  Negative for dizziness and syncope  Hematological: Does not bruise/bleed easily  Psychiatric/Behavioral: Negative for confusion  The patient is not nervous/anxious  All other systems reviewed and are negative  Vitals:    01/13/21 1304 01/13/21 1351 01/13/21 1353   BP: 170/88 140/88 138/80   BP Location: Left arm Left arm Right arm   Patient Position: Sitting     Cuff Size: Standard     Pulse: 67     Temp: (!) 97 4 °F (36 3 °C)     Weight: 92 5 kg (204 lb)     Height: 6' (1 829 m)       Vitals:    01/13/21 1304   Weight: 92 5 kg (204 lb)     Height: 6' (182 9 cm)     Physical Exam   Constitutional: He is oriented to person, place, and time  He appears well-developed and well-nourished  HENT:   Head: Normocephalic and atraumatic  Eyes: Pupils are equal, round, and reactive to light  Conjunctivae are normal    Neck: Normal range of motion  No JVD present  Cardiovascular: Normal rate, regular rhythm and normal heart sounds  Exam reveals no gallop and no friction rub  No murmur heard  Pulmonary/Chest: Effort normal and breath sounds normal    Abdominal: Soft  Bowel sounds are normal    Musculoskeletal:         General: No edema  Neurological: He is alert and oriented to person, place, and time  Skin: Skin is warm and dry  Psychiatric: He has a normal mood and affect  His behavior is normal    Vitals reviewed

## 2021-03-23 NOTE — PATIENT INSTRUCTIONS
I would recommend decreasing metoprolol succinate to 12 5 mg daily  Blood pressure check next week  Call me with any change in symptoms   I would recommend trying to increase water intake

## 2021-03-23 NOTE — ASSESSMENT & PLAN NOTE
Patient denies any history of hypertension however does note intermittent spikes in blood pressure  Blood pressure initially elevated but improved on my personal recheck  Patient is currently on metoprolol succinate 25 mg daily for cardiovascular protection  If blood pressure remains elevated we will need to consider addition of blood pressure medication such as losartan  Patient was asked to keep a log of his blood pressures and bring them to his follow-up visit

## 2021-03-23 NOTE — ASSESSMENT & PLAN NOTE
Coronary Artery Disease:   A  Abnormal stress echocardiogram 11/12/2020 with LAD wall motion abnormality  B  Cardiac catheterization 11/19/2020 showed a 99% stenosed mid LAD lesion which was stented with BLANCA  C  Residual 50% LCx(d) and 50% RtPDA disease  Patient is doing well post stenting  He denies any recurrent chest pain  He has resumed normal activities  Patient declined cardiac rehabilitation as he has been very active since his discharge without limitation  We discussed the importance of DAPT for at least 1 year with aspirin and clopidogrel  We discussed that no one should stop these medications except cardiology  Aspirin will be lifelong  Will plan echocardiogram to assess heart structure and function  Continue metoprolol succinate 25 mg daily  Continue atorvastatin 40 mg daily  Goal LDL < 70

## 2021-03-23 NOTE — ASSESSMENT & PLAN NOTE
Lipid panel 11/20/2020: C 176  T 128  H 51  L 99  Continue atorvastatin 40 mg daily  Lipid panel prior to follow up  Goal LDL < 70

## 2021-04-01 ENCOUNTER — CLINICAL SUPPORT (OUTPATIENT)
Dept: CARDIOLOGY CLINIC | Facility: CLINIC | Age: 71
End: 2021-04-01
Payer: MEDICARE

## 2021-04-01 VITALS
HEART RATE: 94 BPM | SYSTOLIC BLOOD PRESSURE: 142 MMHG | DIASTOLIC BLOOD PRESSURE: 87 MMHG | BODY MASS INDEX: 28.58 KG/M2 | OXYGEN SATURATION: 98 % | WEIGHT: 211 LBS | HEIGHT: 72 IN

## 2021-04-01 DIAGNOSIS — R03.0 ELEVATED BLOOD-PRESSURE READING, WITHOUT DIAGNOSIS OF HYPERTENSION: Primary | ICD-10-CM

## 2021-04-01 PROCEDURE — 99211 OFF/OP EST MAY X REQ PHY/QHP: CPT

## 2021-04-01 NOTE — PROGRESS NOTES
Pt here under the direction by Dr Cesar Corey for a BP check  Pt did have his BP cuff with  That was off by about 10 points each way

## 2021-04-15 ENCOUNTER — CLINICAL SUPPORT (OUTPATIENT)
Dept: CARDIOLOGY CLINIC | Facility: CLINIC | Age: 71
End: 2021-04-15
Payer: MEDICARE

## 2021-04-15 DIAGNOSIS — I25.10 CORONARY ARTERY DISEASE INVOLVING NATIVE CORONARY ARTERY OF NATIVE HEART WITHOUT ANGINA PECTORIS: ICD-10-CM

## 2021-04-15 DIAGNOSIS — R55 NEAR SYNCOPE: ICD-10-CM

## 2021-04-15 DIAGNOSIS — R00.1 BRADYCARDIA: ICD-10-CM

## 2021-04-15 PROCEDURE — 93244 EXT ECG>48HR<7D REV&INTERPJ: CPT | Performed by: INTERNAL MEDICINE

## 2021-07-13 ENCOUNTER — OFFICE VISIT (OUTPATIENT)
Dept: CARDIOLOGY CLINIC | Facility: CLINIC | Age: 71
End: 2021-07-13
Payer: MEDICARE

## 2021-07-13 VITALS
SYSTOLIC BLOOD PRESSURE: 118 MMHG | HEIGHT: 72 IN | WEIGHT: 214.8 LBS | OXYGEN SATURATION: 97 % | HEART RATE: 60 BPM | DIASTOLIC BLOOD PRESSURE: 80 MMHG | BODY MASS INDEX: 29.09 KG/M2

## 2021-07-13 DIAGNOSIS — E78.5 DYSLIPIDEMIA: ICD-10-CM

## 2021-07-13 DIAGNOSIS — R60.0 LOCALIZED EDEMA: ICD-10-CM

## 2021-07-13 DIAGNOSIS — R03.0 ELEVATED BLOOD-PRESSURE READING, WITHOUT DIAGNOSIS OF HYPERTENSION: ICD-10-CM

## 2021-07-13 DIAGNOSIS — Z95.5 S/P CORONARY ARTERY STENT PLACEMENT: ICD-10-CM

## 2021-07-13 DIAGNOSIS — I25.10 CORONARY ARTERY DISEASE INVOLVING NATIVE CORONARY ARTERY OF NATIVE HEART WITHOUT ANGINA PECTORIS: Primary | ICD-10-CM

## 2021-07-13 PROCEDURE — 99214 OFFICE O/P EST MOD 30 MIN: CPT | Performed by: NURSE PRACTITIONER

## 2021-07-13 RX ORDER — FUROSEMIDE 20 MG/1
20 TABLET ORAL DAILY PRN
Qty: 30 TABLET | Refills: 0 | Status: SHIPPED | OUTPATIENT
Start: 2021-07-13 | End: 2021-08-11 | Stop reason: SDUPTHER

## 2021-07-13 RX ORDER — ATORVASTATIN CALCIUM 40 MG/1
40 TABLET, FILM COATED ORAL
Qty: 90 TABLET | Refills: 3 | Status: SHIPPED | OUTPATIENT
Start: 2021-07-13 | End: 2022-05-04 | Stop reason: SDUPTHER

## 2021-07-13 RX ORDER — METOPROLOL SUCCINATE 25 MG/1
12.5 TABLET, EXTENDED RELEASE ORAL
Qty: 45 TABLET | Refills: 3
Start: 2021-07-13 | End: 2022-01-24 | Stop reason: SDUPTHER

## 2021-07-13 RX ORDER — CLOPIDOGREL BISULFATE 75 MG/1
75 TABLET ORAL DAILY
Qty: 90 TABLET | Refills: 0 | Status: SHIPPED | OUTPATIENT
Start: 2021-07-13 | End: 2021-11-12 | Stop reason: HOSPADM

## 2021-07-13 RX ORDER — NITROGLYCERIN 0.4 MG/1
0.4 TABLET SUBLINGUAL
COMMUNITY
End: 2022-07-13 | Stop reason: SDUPTHER

## 2021-07-13 NOTE — PATIENT INSTRUCTIONS
Blood pressure is great today! ZIO monitor showed a few episodes of extra beats from the top and bottom of the heart, but these were short, and you had no symptoms during these episodes  Therefore I will not adjust your Metoprolol dose   You can try an addition of Magnesium 250mg daily to help with any muscle cramps or palpitations should they occur  Recommend limiting sodium to 2000mg (2g) per day  Will add Furosemide 20mg to use once daily only as needed for weight gain or swelling  Check kidney function and electrolytes through blood test 2 weeks after starting medication  Notify me immediately if any chest pain, lightheadedness, shortness of breath, or any other concerns

## 2021-07-13 NOTE — PROGRESS NOTES
Cardiology Follow Up    Pepito Caba  1950  [id number]  [facility]  [address]  [address] [place] [address] [address] [facility]  [phone]    Caroline Meza presents today for routine follow up of CAD s/p LAD stent placement, HLD  1  Coronary artery disease involving native coronary artery of native heart without angina pectoris  Assessment & Plan:  Coronary Artery Disease:   A  Abnormal stress echocardiogram 11/12/2020 with LAD wall motion abnormality  B  Cardiac catheterization 11/19/2020 showed a 99% stenosed mid LAD lesion which was stented with BLANCA  C  Residual 50% LCx(d) and 50% RtPDA disease  Patient is doing well post stenting  He denies any recurrent chest pain  He has resumed normal activities  Patient declined cardiac rehabilitation as he has been very active since his discharge without limitation  We discussed the importance of DAPT for at least 1 year with aspirin and clopidogrel  This would be through November 2021  We discussed that no one should stop these medications except cardiology  Aspirin will be lifelong  Ecoh 2/2021 with EF 60% and no regional wall motion abnormalities  Continue metoprolol succinate 12 5mg daily  Continue atorvastatin 40 mg daily  Goal LDL < 70        2  S/P coronary artery stent placement  Assessment & Plan:  See discussion under CAD    Orders:  -     atorvastatin (LIPITOR) 40 mg tablet; Take 1 tablet (40 mg total) by mouth daily with dinner  -     clopidogrel (PLAVIX) 75 mg tablet; Take 1 tablet (75 mg total) by mouth daily  -     metoprolol succinate (TOPROL-XL) 25 mg 24 hr tablet; Take 0 5 tablets (12 5 mg total) by mouth daily at bedtime    3  Dyslipidemia  Assessment & Plan:  Lipid panel 3/16/2021: C 105  T 78  H 58  L 31  Continue atorvastatin 40 mg daily     Goal LDL < 70       4  Localized edema  Assessment & Plan:  Pt notes intermittent lower extremity edema  Echo 2/2021 with EF 60% and grade 1 diastolic dysfunction  No shortness of breath or rapid weight gain noted on today's exam  Will add Furosemide 20mg daily for PRN use for leg swelling  Check BMP in 2 weeks to monitor renal function and electrolytes    Orders:  -     furosemide (LASIX) 20 mg tablet; Take 1 tablet (20 mg total) by mouth daily as needed (swelling)  -     Basic metabolic panel; Future; Expected date: 07/27/2021    5  Elevated blood-pressure reading, without diagnosis of hypertension  Assessment & Plan:  Patient denies any history of hypertension however was previously noting intermittent spikes in blood pressure  Blood pressure in good control on my assessment today  Patient is currently on metoprolol succinate 12 5 mg daily for cardiovascular protection  If blood pressure becomes elevated in the future we will need to consider addition of blood pressure medication such as losartan  Patient was asked to keep a log of his blood pressures and bring them to his follow-up visit           From previous OV's with Dr Gabriela Ray:  HPI   Patient with prior history of hypertension and dyslipidemia which was intermittently treated  Patient had noted onset of chest pain with radiation to his neck and the right side of his jaw primarily with exertion  Stress echocardiogram was done 11/12/2020 which is notable for reproduction of symptoms of anginal chest pain  Frequent PVCs were noted and the patient was noted to have hypokinesis in the LAD territory      Patient underwent elective cardiac catheterization 11/19/2020  Patient was noted to have a 99% mid LAD stenosis just after the 1st diagonal   This was treated with PCI /BLANCA  Residual nonobstructive disease was noted in the distal left circumflex and PDA  He was discharged home on aspirin, clopidogrel, metoprolol succinate and atorvastatin      Patient was seen at [address] [place] [address] 11/24/2020 at which time he was doing well   He declined cardiac rehabilitation as he was very active  He wanted to transfer to cardiology closer to home       1/13/2021: Patient presents to the office today to establish care  In general he has been feeling well  He has had intermittent spikes in his blood pressure since his discharge  He also reports episodes of lightheadedness which seem to occur 1 blood pressure is low  He denies any recurrent chest pain  He denies any palpitations  He remains active at home without limitation  He has been taking his medications as prescribed  Both he and his wife transition to a more plant based diet  3/23/2021: Pt complained of lightheadedness with position change  Metoprolol dose reduced to 12 5mg daily  ZIO monitoring was ordered  7/13/2021: Today Floyd Casper reports feeling very well  He notes resolution of the lightheadedness he was experiencing previously and is tolerating the Metoprolol succinate 12 5mg daily dose  He denies any chest pain, shortness of breath, palpitations  He does notice mild swelling in his lower legs  He denies major fluctuations in weight  We reviewed the preliminary results of his ZIO monitoring  Medical Problems     Problem List     Coronary artery disease involving native coronary artery    S/P coronary artery stent placement    Overview Signed 11/19/2020  3:13 PM by Christine Pérez PA-C     elective cath with PCI/BLANCA - mLAD 11/19/2020 [facility]         Dyslipidemia    Abnormal stress test    Elevated blood-pressure reading, without diagnosis of hypertension              Past Medical History:   Diagnosis Date    Abnormal stress test     Coronary artery disease     BLANCA to LAD(m) 11/19/2020      Depression     Dyslipidemia 11/19/2020    History of BPH     Inguinal hernia     Left      Social History     Socioeconomic History    Marital status: /Civil Union     Spouse name: Not on file    Number of children: Not on file    Years of education: Not on file    Highest education level: Not on file   Occupational History    Occupation: Retired   Tobacco Use    Smoking status: Never Smoker    Smokeless tobacco: Never Used   Vaping Use    Vaping Use: Never used   Substance and Sexual Activity    Alcohol use: Yes     Alcohol/week: 1 0 standard drinks     Types: 1 Glasses of wine per week     Comment: socially     Drug use: Never    Sexual activity: Yes     Partners: Female   Other Topics Concern    Not on file   Social History Narrative    Not on file     Social Determinants of Health     Financial Resource Strain:     Difficulty of Paying Living Expenses:    Food Insecurity:     Worried About Running Out of Food in the Last Year:     [address] in the Last Year:    Transportation Needs:     Lack of Transportation (Medical):  Lack of Transportation (Non-Medical):    Physical Activity:     Days of Exercise per Week:     Minutes of Exercise per Session:    Stress:     Feeling of Stress :    Social Connections:     Frequency of Communication with Friends and Family:     Frequency of Social Gatherings with Friends and Family:     Attends Religion Services:     Active Member of Clubs or Organizations:     Attends Club or Organization Meetings:     Marital Status:    Intimate Partner Violence:     Fear of Current or Ex-Partner:     Emotionally Abused:     Physically Abused:     Sexually Abused:       Family History   Problem Relation Age of Onset    Breast cancer Mother          at age 67   [facility] No Known Problems Father     No Known Problems Sister     No Known Problems Brother     No Known Problems Brother     No Known Problems Brother      Past Surgical History:   Procedure Laterality Date    CARDIAC CATHETERIZATION  2020    99% LAD(m) after D1  Residual 50% LCx(d) and 50% RtPDA   CORONARY ANGIOPLASTY WITH STENT PLACEMENT  2020    BLANCA to LAD(m)      LUMBAR DISC SURGERY      WRIST SURGERY Right        Current Outpatient Medications:     aspirin (ECOTRIN LOW STRENGTH) 81 mg EC tablet, Take 81 mg by mouth daily, Disp: , Rfl:     atorvastatin (LIPITOR) 40 mg tablet, Take 1 tablet (40 mg total) by mouth daily with dinner, Disp: 90 tablet, Rfl: 3    clopidogrel (PLAVIX) 75 mg tablet, Take 1 tablet (75 mg total) by mouth daily, Disp: 90 tablet, Rfl: 0    metoprolol succinate (TOPROL-XL) 25 mg 24 hr tablet, Take 0 5 tablets (12 5 mg total) by mouth daily at bedtime, Disp: 45 tablet, Rfl: 3    nitroglycerin (NITROSTAT) 0 4 mg SL tablet, Place 0 4 mg under the tongue every 5 (five) minutes as needed for chest pain, Disp: , Rfl:     Omega-3 Fatty Acids (Fish Oil) 1200 MG CAPS, Fish Oil, Disp: , Rfl:     PARoxetine (Paxil) 10 mg tablet, Take 10 mg by mouth daily, Disp: , Rfl:     tamsulosin (Flomax) 0 4 mg, Take 0 4 mg by mouth daily with dinner, Disp: , Rfl:     furosemide (LASIX) 20 mg tablet, Take 1 tablet (20 mg total) by mouth daily as needed (swelling), Disp: 30 tablet, Rfl: 0  Allergies   Allergen Reactions    Amoxicillin Rash       Labs:     Chemistry        Component Value Date/Time    K 4 0 2020 0438     2020 0438    CO2 26 2020 0438    BUN 17 2020 0438    CREATININE 1 04 2020 0438        Component Value Date/Time    CALCIUM 9 0 2020 0438    ALKPHOS 59 2020 0856    AST 16 2020 0856    ALT 23 2020 0856            No results found for: CHOL  Lab Results   Component Value Date    HDL 58 2021    HDL 51 2020     Lab Results   Component Value Date    LDLCALC 31 2021    LDLCALC 99 2020     Lab Results   Component Value Date    TRIG 78 2021    TRIG 128 2020     Cardiac Testin day ZIO monitoring 3/23/2021-3/30/2021: final results pending    Lipid panel 3/16/2021:   TG 78  HDL 58  LDL 31  Echocardiogram 2/10/2021: EF 60%  [address] [facility]  Grade 1 diastolic dysfunction  IVC dilated  No obvious valvular stenosis/regurgitation noted      EKG 2021: Normal sinus rhythm    Nonspecific ST abnormality      Lipid panel 11/20/2020: C 176  T 128  H 51  L 99  Review of Systems   Constitutional: Negative  HENT: Negative  Cardiovascular: Positive for leg swelling  Negative for chest pain, dyspnea on exertion, irregular heartbeat, near-syncope, orthopnea, palpitations and syncope  Respiratory: Negative for cough and snoring  Endocrine: Negative  Skin: Negative  Musculoskeletal: Negative  Gastrointestinal: Negative  Genitourinary: Negative  Neurological: Negative  Negative for light-headedness  Psychiatric/Behavioral: Negative  Vitals:    07/13/21 1338   BP: 118/80   Pulse:    SpO2:      Vitals:    07/13/21 1301   Weight: 97 4 kg (214 lb 12 8 oz)     Height: 6' (182 9 cm)   Body mass index is 29 13 kg/m²  Physical Exam  Vitals and nursing note reviewed  Constitutional:       General: He is not in acute distress  Appearance: He is well-developed  He is not diaphoretic  HENT:      Head: Normocephalic and atraumatic  Neck:      Vascular: No carotid bruit or JVD  Cardiovascular:      Rate and Rhythm: Normal rate and regular rhythm  No extrasystoles are present  Pulses: Intact distal pulses  Heart sounds: Normal heart sounds, S1 normal and S2 normal  No murmur heard  No friction rub  No gallop  Comments: Mild lower extremity edema bilaterally  Pulmonary:      Effort: Pulmonary effort is normal  No respiratory distress  Breath sounds: Normal breath sounds  Abdominal:      General: There is no distension  Palpations: Abdomen is soft  Tenderness: There is no abdominal tenderness  Skin:     General: Skin is warm and dry  Findings: No rash  Neurological:      Mental Status: He is alert and oriented to person, place, and time     Psychiatric:         Behavior: Behavior normal

## 2021-07-14 PROBLEM — R60.0 LOCALIZED EDEMA: Status: ACTIVE | Noted: 2021-07-14

## 2021-07-14 NOTE — ASSESSMENT & PLAN NOTE
Patient denies any history of hypertension however was previously noting intermittent spikes in blood pressure  Blood pressure in good control on my assessment today  Patient is currently on metoprolol succinate 12 5 mg daily for cardiovascular protection  If blood pressure becomes elevated in the future we will need to consider addition of blood pressure medication such as losartan  Patient was asked to keep a log of his blood pressures and bring them to his follow-up visit

## 2021-07-14 NOTE — ASSESSMENT & PLAN NOTE
Coronary Artery Disease:   A  Abnormal stress echocardiogram 11/12/2020 with LAD wall motion abnormality  B  Cardiac catheterization 11/19/2020 showed a 99% stenosed mid LAD lesion which was stented with BLANCA  C  Residual 50% LCx(d) and 50% RtPDA disease  Patient is doing well post stenting  He denies any recurrent chest pain  He has resumed normal activities  Patient declined cardiac rehabilitation as he has been very active since his discharge without limitation  We discussed the importance of DAPT for at least 1 year with aspirin and clopidogrel  This would be through November 2021  We discussed that no one should stop these medications except cardiology  Aspirin will be lifelong  Ecoh 2/2021 with EF 60% and no regional wall motion abnormalities  Continue metoprolol succinate 12 5mg daily  Continue atorvastatin 40 mg daily  Goal LDL < 70

## 2021-07-14 NOTE — ASSESSMENT & PLAN NOTE
Pt notes intermittent lower extremity edema  Echo 2/2021 with EF 60% and grade 1 diastolic dysfunction  No shortness of breath or rapid weight gain noted on today's exam  Will add Furosemide 20mg daily for PRN use for leg swelling  Check BMP in 2 weeks to monitor renal function and electrolytes

## 2021-08-11 DIAGNOSIS — R60.0 LOCALIZED EDEMA: ICD-10-CM

## 2021-08-11 RX ORDER — FUROSEMIDE 20 MG/1
20 TABLET ORAL DAILY PRN
Qty: 30 TABLET | Refills: 11 | Status: SHIPPED | OUTPATIENT
Start: 2021-08-11 | End: 2021-11-03 | Stop reason: ALTCHOICE

## 2021-08-11 NOTE — TELEPHONE ENCOUNTER
I refilled pt's Furosemide per pharmacy request but I do ask that he completes the BMP I ordered at his visit to check his kidney function and electrolytes  Thank you!

## 2021-11-03 ENCOUNTER — OFFICE VISIT (OUTPATIENT)
Dept: CARDIOLOGY CLINIC | Facility: CLINIC | Age: 71
End: 2021-11-03
Payer: MEDICARE

## 2021-11-03 VITALS
WEIGHT: 222.4 LBS | DIASTOLIC BLOOD PRESSURE: 80 MMHG | OXYGEN SATURATION: 98 % | HEIGHT: 72 IN | HEART RATE: 58 BPM | BODY MASS INDEX: 30.12 KG/M2 | SYSTOLIC BLOOD PRESSURE: 148 MMHG

## 2021-11-03 DIAGNOSIS — Z01.818 PRE-OP EXAMINATION: Primary | ICD-10-CM

## 2021-11-03 DIAGNOSIS — Z95.5 S/P CORONARY ARTERY STENT PLACEMENT: ICD-10-CM

## 2021-11-03 DIAGNOSIS — I25.10 CORONARY ARTERY DISEASE INVOLVING NATIVE CORONARY ARTERY OF NATIVE HEART WITHOUT ANGINA PECTORIS: ICD-10-CM

## 2021-11-03 DIAGNOSIS — E78.5 DYSLIPIDEMIA: ICD-10-CM

## 2021-11-03 DIAGNOSIS — R03.0 ELEVATED BLOOD-PRESSURE READING, WITHOUT DIAGNOSIS OF HYPERTENSION: ICD-10-CM

## 2021-11-03 PROCEDURE — 99214 OFFICE O/P EST MOD 30 MIN: CPT | Performed by: NURSE PRACTITIONER

## 2021-11-03 PROCEDURE — 93000 ELECTROCARDIOGRAM COMPLETE: CPT | Performed by: NURSE PRACTITIONER

## 2021-11-04 ENCOUNTER — APPOINTMENT (OUTPATIENT)
Dept: LAB | Facility: HOSPITAL | Age: 71
End: 2021-11-04
Payer: MEDICARE

## 2021-11-04 ENCOUNTER — TELEPHONE (OUTPATIENT)
Dept: CARDIOLOGY CLINIC | Facility: CLINIC | Age: 71
End: 2021-11-04

## 2021-11-04 DIAGNOSIS — Z01.818 PRE-OP TESTING: Primary | ICD-10-CM

## 2021-11-04 DIAGNOSIS — R60.0 LOCALIZED EDEMA: ICD-10-CM

## 2021-11-04 DIAGNOSIS — E78.5 DYSLIPIDEMIA: ICD-10-CM

## 2021-11-04 LAB
ANION GAP SERPL CALCULATED.3IONS-SCNC: 5 MMOL/L (ref 4–13)
BASOPHILS # BLD AUTO: 0.05 THOUSANDS/ΜL (ref 0–0.1)
BASOPHILS NFR BLD AUTO: 1 % (ref 0–1)
BUN SERPL-MCNC: 22 MG/DL (ref 5–25)
CALCIUM SERPL-MCNC: 9 MG/DL (ref 8.3–10.1)
CHLORIDE SERPL-SCNC: 107 MMOL/L (ref 100–108)
CO2 SERPL-SCNC: 29 MMOL/L (ref 21–32)
CREAT SERPL-MCNC: 1.07 MG/DL (ref 0.6–1.3)
EOSINOPHIL # BLD AUTO: 0.31 THOUSAND/ΜL (ref 0–0.61)
EOSINOPHIL NFR BLD AUTO: 7 % (ref 0–6)
ERYTHROCYTE [DISTWIDTH] IN BLOOD BY AUTOMATED COUNT: 12.5 % (ref 11.6–15.1)
GFR SERPL CREATININE-BSD FRML MDRD: 70 ML/MIN/1.73SQ M
GLUCOSE P FAST SERPL-MCNC: 102 MG/DL (ref 65–99)
HCT VFR BLD AUTO: 46 % (ref 36.5–49.3)
HGB BLD-MCNC: 15.4 G/DL (ref 12–17)
IMM GRANULOCYTES # BLD AUTO: 0.01 THOUSAND/UL (ref 0–0.2)
IMM GRANULOCYTES NFR BLD AUTO: 0 % (ref 0–2)
LYMPHOCYTES # BLD AUTO: 1.16 THOUSANDS/ΜL (ref 0.6–4.47)
LYMPHOCYTES NFR BLD AUTO: 25 % (ref 14–44)
MCH RBC QN AUTO: 29.8 PG (ref 26.8–34.3)
MCHC RBC AUTO-ENTMCNC: 33.5 G/DL (ref 31.4–37.4)
MCV RBC AUTO: 89 FL (ref 82–98)
MONOCYTES # BLD AUTO: 0.53 THOUSAND/ΜL (ref 0.17–1.22)
MONOCYTES NFR BLD AUTO: 11 % (ref 4–12)
NEUTROPHILS # BLD AUTO: 2.57 THOUSANDS/ΜL (ref 1.85–7.62)
NEUTS SEG NFR BLD AUTO: 56 % (ref 43–75)
NRBC BLD AUTO-RTO: 0 /100 WBCS
PLATELET # BLD AUTO: 207 THOUSANDS/UL (ref 149–390)
PMV BLD AUTO: 10.8 FL (ref 8.9–12.7)
POTASSIUM SERPL-SCNC: 4.2 MMOL/L (ref 3.5–5.3)
RBC # BLD AUTO: 5.16 MILLION/UL (ref 3.88–5.62)
SODIUM SERPL-SCNC: 141 MMOL/L (ref 136–145)
WBC # BLD AUTO: 4.63 THOUSAND/UL (ref 4.31–10.16)

## 2021-11-04 PROCEDURE — 36415 COLL VENOUS BLD VENIPUNCTURE: CPT

## 2021-11-04 PROCEDURE — 85025 COMPLETE CBC W/AUTO DIFF WBC: CPT

## 2021-11-04 PROCEDURE — 80048 BASIC METABOLIC PNL TOTAL CA: CPT

## 2021-11-04 NOTE — PRE-PROCEDURE INSTRUCTIONS
Pre-Surgery Instructions:   Medication Instructions   • aspirin (ECOTRIN LOW STRENGTH) 81 mg EC tablet Patient was instructed by Physician and understands  • atorvastatin (LIPITOR) 40 mg tablet Instructed patient per Anesthesia Guidelines  • clopidogrel (PLAVIX) 75 mg tablet Patient was instructed by Physician and understands  • metoprolol succinate (TOPROL-XL) 25 mg 24 hr tablet Instructed patient per Anesthesia Guidelines  • Misc Natural Products (PROSTATE SUPPORT PO) Instructed patient per Anesthesia Guidelines  • Omega-3 Fatty Acids (Fish Oil) 1200 MG CAPS Instructed patient per Anesthesia Guidelines  • PARoxetine (Paxil) 10 mg tablet Instructed patient per Anesthesia Guidelines  • tamsulosin (Flomax) 0 4 mg Instructed patient per Anesthesia Guidelines  Pt instructed to stop Plavix on 11/7/21 but is to continue aspirin 81 mg daily    Pt was instructed to stop the fish oil and prostate support one week before sx 11/5/21  Pt will not be taking any meds the am of surgery

## 2021-11-11 ENCOUNTER — ANESTHESIA EVENT (OUTPATIENT)
Dept: PERIOP | Facility: HOSPITAL | Age: 71
End: 2021-11-11
Payer: MEDICARE

## 2021-11-12 ENCOUNTER — ANESTHESIA (OUTPATIENT)
Dept: PERIOP | Facility: HOSPITAL | Age: 71
End: 2021-11-12
Payer: MEDICARE

## 2021-11-12 ENCOUNTER — HOSPITAL ENCOUNTER (OUTPATIENT)
Facility: HOSPITAL | Age: 71
Setting detail: OUTPATIENT SURGERY
Discharge: HOME | End: 2021-11-12
Attending: STUDENT IN AN ORGANIZED HEALTH CARE EDUCATION/TRAINING PROGRAM | Admitting: STUDENT IN AN ORGANIZED HEALTH CARE EDUCATION/TRAINING PROGRAM
Payer: MEDICARE

## 2021-11-12 VITALS
TEMPERATURE: 97.8 F | HEART RATE: 68 BPM | HEIGHT: 72 IN | BODY MASS INDEX: 30.07 KG/M2 | OXYGEN SATURATION: 97 % | WEIGHT: 222 LBS | RESPIRATION RATE: 18 BRPM | SYSTOLIC BLOOD PRESSURE: 142 MMHG | DIASTOLIC BLOOD PRESSURE: 83 MMHG

## 2021-11-12 DIAGNOSIS — K40.90 NON-RECURRENT UNILATERAL INGUINAL HERNIA WITHOUT OBSTRUCTION OR GANGRENE: Primary | ICD-10-CM

## 2021-11-12 PROBLEM — I10 HTN (HYPERTENSION): Status: ACTIVE | Noted: 2021-11-12

## 2021-11-12 PROBLEM — E78.5 HYPERLIPIDEMIA: Status: ACTIVE | Noted: 2021-11-12

## 2021-11-12 PROBLEM — Z98.61 HISTORY OF PTCA: Status: ACTIVE | Noted: 2021-11-12

## 2021-11-12 PROCEDURE — C1781 MESH (IMPLANTABLE): HCPCS | Performed by: STUDENT IN AN ORGANIZED HEALTH CARE EDUCATION/TRAINING PROGRAM

## 2021-11-12 PROCEDURE — 93005 ELECTROCARDIOGRAM TRACING: CPT

## 2021-11-12 DEVICE — 3DMAX™ MID ANATOMICAL MESH, LARGE, LEFT, 4" X 6", 10 X 16 CM
Type: IMPLANTABLE DEVICE | Site: INGUINAL | Status: FUNCTIONAL
Brand: 3DMAX™ MID ANATOMICAL MESH

## 2021-11-12 RX ORDER — FENTANYL CITRATE 50 UG/ML
INJECTION, SOLUTION INTRAMUSCULAR; INTRAVENOUS AS NEEDED
Status: DISCONTINUED | OUTPATIENT
Start: 2021-11-12 | End: 2021-11-12

## 2021-11-12 RX ORDER — KETOROLAC TROMETHAMINE 30 MG/ML
INJECTION, SOLUTION INTRAMUSCULAR; INTRAVENOUS AS NEEDED
Status: DISCONTINUED | OUTPATIENT
Start: 2021-11-12 | End: 2021-11-12

## 2021-11-12 RX ORDER — DEXAMETHASONE SODIUM PHOSPHATE 4 MG/ML
INJECTION, SOLUTION INTRA-ARTICULAR; INTRALESIONAL; INTRAMUSCULAR; INTRAVENOUS; SOFT TISSUE AS NEEDED
Status: DISCONTINUED | OUTPATIENT
Start: 2021-11-12 | End: 2021-11-12

## 2021-11-12 RX ORDER — ONDANSETRON 2 MG/ML
4 INJECTION INTRAMUSCULAR; INTRAVENOUS ONCE AS NEEDED
Status: DISCONTINUED | OUTPATIENT
Start: 2021-11-12 | End: 2021-11-12 | Stop reason: HOSPADM

## 2021-11-12 RX ORDER — METOPROLOL TARTRATE 5 MG/5ML
INJECTION INTRAVENOUS AS NEEDED
Status: DISCONTINUED | OUTPATIENT
Start: 2021-11-12 | End: 2021-11-12

## 2021-11-12 RX ORDER — FENTANYL CITRATE/PF 50 MCG/ML
50 SYRINGE (ML) INJECTION
Status: DISCONTINUED | OUTPATIENT
Start: 2021-11-12 | End: 2021-11-12 | Stop reason: HOSPADM

## 2021-11-12 RX ORDER — ONDANSETRON 2 MG/ML
INJECTION INTRAMUSCULAR; INTRAVENOUS AS NEEDED
Status: DISCONTINUED | OUTPATIENT
Start: 2021-11-12 | End: 2021-11-12

## 2021-11-12 RX ORDER — SODIUM CHLORIDE, SODIUM LACTATE, POTASSIUM CHLORIDE, CALCIUM CHLORIDE 600; 310; 30; 20 MG/100ML; MG/100ML; MG/100ML; MG/100ML
125 INJECTION, SOLUTION INTRAVENOUS CONTINUOUS
Status: DISCONTINUED | OUTPATIENT
Start: 2021-11-12 | End: 2021-11-12 | Stop reason: HOSPADM

## 2021-11-12 RX ORDER — PROPOFOL 10 MG/ML
INJECTION, EMULSION INTRAVENOUS AS NEEDED
Status: DISCONTINUED | OUTPATIENT
Start: 2021-11-12 | End: 2021-11-12

## 2021-11-12 RX ORDER — SODIUM CHLORIDE, SODIUM LACTATE, POTASSIUM CHLORIDE, CALCIUM CHLORIDE 600; 310; 30; 20 MG/100ML; MG/100ML; MG/100ML; MG/100ML
75 INJECTION, SOLUTION INTRAVENOUS CONTINUOUS
Status: DISCONTINUED | OUTPATIENT
Start: 2021-11-12 | End: 2021-11-12 | Stop reason: HOSPADM

## 2021-11-12 RX ORDER — BUPIVACAINE HYDROCHLORIDE AND EPINEPHRINE 5; 5 MG/ML; UG/ML
INJECTION, SOLUTION EPIDURAL; INTRACAUDAL; PERINEURAL AS NEEDED
Status: DISCONTINUED | OUTPATIENT
Start: 2021-11-12 | End: 2021-11-12 | Stop reason: HOSPADM

## 2021-11-12 RX ORDER — IBUPROFEN 600 MG/1
600 TABLET ORAL EVERY 6 HOURS PRN
Qty: 30 TABLET | Refills: 0 | Status: SHIPPED | OUTPATIENT
Start: 2021-11-12 | End: 2022-04-29

## 2021-11-12 RX ORDER — ESMOLOL HYDROCHLORIDE 10 MG/ML
INJECTION INTRAVENOUS AS NEEDED
Status: DISCONTINUED | OUTPATIENT
Start: 2021-11-12 | End: 2021-11-12

## 2021-11-12 RX ORDER — LIDOCAINE HYDROCHLORIDE 10 MG/ML
INJECTION, SOLUTION EPIDURAL; INFILTRATION; INTRACAUDAL; PERINEURAL AS NEEDED
Status: DISCONTINUED | OUTPATIENT
Start: 2021-11-12 | End: 2021-11-12

## 2021-11-12 RX ORDER — CEFAZOLIN SODIUM 2 G/50ML
2000 SOLUTION INTRAVENOUS ONCE
Status: DISCONTINUED | OUTPATIENT
Start: 2021-11-12 | End: 2021-11-12 | Stop reason: HOSPADM

## 2021-11-12 RX ORDER — ROCURONIUM BROMIDE 10 MG/ML
INJECTION, SOLUTION INTRAVENOUS AS NEEDED
Status: DISCONTINUED | OUTPATIENT
Start: 2021-11-12 | End: 2021-11-12

## 2021-11-12 RX ORDER — CEFAZOLIN SODIUM 2 G/50ML
SOLUTION INTRAVENOUS AS NEEDED
Status: DISCONTINUED | OUTPATIENT
Start: 2021-11-12 | End: 2021-11-12

## 2021-11-12 RX ORDER — MIDAZOLAM HYDROCHLORIDE 2 MG/2ML
INJECTION, SOLUTION INTRAMUSCULAR; INTRAVENOUS AS NEEDED
Status: DISCONTINUED | OUTPATIENT
Start: 2021-11-12 | End: 2021-11-12

## 2021-11-12 RX ADMIN — METOPROLOL TARTRATE 2.5 MG: 5 INJECTION INTRAVENOUS at 10:32

## 2021-11-12 RX ADMIN — FENTANYL CITRATE 50 MCG: 50 INJECTION, SOLUTION INTRAMUSCULAR; INTRAVENOUS at 10:10

## 2021-11-12 RX ADMIN — ROCURONIUM BROMIDE 10 MG: 10 INJECTION, SOLUTION INTRAVENOUS at 11:08

## 2021-11-12 RX ADMIN — SUGAMMADEX 200 MG: 100 INJECTION, SOLUTION INTRAVENOUS at 11:39

## 2021-11-12 RX ADMIN — PROPOFOL 200 MG: 10 INJECTION, EMULSION INTRAVENOUS at 10:10

## 2021-11-12 RX ADMIN — ESMOLOL HYDROCHLORIDE 30 MG: 10 INJECTION, SOLUTION INTRAVENOUS at 10:17

## 2021-11-12 RX ADMIN — ESMOLOL HYDROCHLORIDE 20 MG: 10 INJECTION, SOLUTION INTRAVENOUS at 10:10

## 2021-11-12 RX ADMIN — LIDOCAINE HYDROCHLORIDE 50 MG: 10 INJECTION, SOLUTION EPIDURAL; INFILTRATION; INTRACAUDAL; PERINEURAL at 10:10

## 2021-11-12 RX ADMIN — ROCURONIUM BROMIDE 10 MG: 10 INJECTION, SOLUTION INTRAVENOUS at 11:06

## 2021-11-12 RX ADMIN — KETOROLAC TROMETHAMINE 30 MG: 30 INJECTION, SOLUTION INTRAMUSCULAR at 11:45

## 2021-11-12 RX ADMIN — PROPOFOL 50 MG: 10 INJECTION, EMULSION INTRAVENOUS at 11:57

## 2021-11-12 RX ADMIN — MIDAZOLAM HYDROCHLORIDE 2 MG: 1 INJECTION, SOLUTION INTRAMUSCULAR; INTRAVENOUS at 10:05

## 2021-11-12 RX ADMIN — ESMOLOL HYDROCHLORIDE 20 MG: 10 INJECTION, SOLUTION INTRAVENOUS at 10:20

## 2021-11-12 RX ADMIN — CEFAZOLIN SODIUM 2000 MG: 2 SOLUTION INTRAVENOUS at 10:07

## 2021-11-12 RX ADMIN — SODIUM CHLORIDE, SODIUM LACTATE, POTASSIUM CHLORIDE, AND CALCIUM CHLORIDE: .6; .31; .03; .02 INJECTION, SOLUTION INTRAVENOUS at 10:07

## 2021-11-12 RX ADMIN — ROCURONIUM BROMIDE 50 MG: 10 INJECTION, SOLUTION INTRAVENOUS at 10:10

## 2021-11-12 RX ADMIN — ESMOLOL HYDROCHLORIDE 10 MG: 10 INJECTION, SOLUTION INTRAVENOUS at 10:12

## 2021-11-12 RX ADMIN — ONDANSETRON 4 MG: 2 INJECTION INTRAMUSCULAR; INTRAVENOUS at 11:40

## 2021-11-12 RX ADMIN — SODIUM CHLORIDE, SODIUM LACTATE, POTASSIUM CHLORIDE, AND CALCIUM CHLORIDE: .6; .31; .03; .02 INJECTION, SOLUTION INTRAVENOUS at 11:03

## 2021-11-12 RX ADMIN — DEXAMETHASONE SODIUM PHOSPHATE 4 MG: 4 INJECTION, SOLUTION INTRAMUSCULAR; INTRAVENOUS at 10:10

## 2021-11-12 RX ADMIN — FENTANYL CITRATE 50 MCG: 50 INJECTION, SOLUTION INTRAMUSCULAR; INTRAVENOUS at 11:55

## 2021-11-12 NOTE — OP NOTE
OPERATIVE REPORT  PATIENT NAME: Yesika Wilson    :  1950  MRN: [id number]  Pt Location: OW OR ROOM 01    SURGERY DATE: 2021    Surgeon(s) and Role:     * Tomeka Wilson, DO - Primary     * Fabiola Brunner PA-C - Assisting    Preop Diagnosis:  Unilateral inguinal hernia, without obstruction or gangrene, not specified as recurrent [K40 90]    Post-Op Diagnosis Codes:     * Unilateral inguinal hernia, without obstruction or gangrene, not specified as recurrent [K40 90]    Procedure(s) (LRB):  ROBOTIC ASSISTED LAPAROSCOPIC INGUINAL HERNIA REPAIR WITH MESH (Left)  OPEN INGUINAL HERNIA REPAIR WITH MESH (Left)    Specimen(s):  * No specimens in log *    Estimated Blood Loss:   Minimal    Drains:  * No LDAs found *    Anesthesia Type:   General    Operative Indications:  Unilateral inguinal hernia, without obstruction or gangrene, not specified as recurrent [K40 90]      Operative Findings:  Left indirect inguinal hernia containing sigmoid colon, intra-abdominal adhesions in the left lower quadrant    Complications:   None    Procedure and Technique:  The patient is brought to the operating  A time-out was held the patient identified and procedure verified  Appropriate antibiotic prophylaxis and DVT prophylaxis was used  General anesthesia was administered  The area of the abdomen is prepped and draped usual sterile fashion using chlorhexidine solution  Local anesthesia and 1% lidocaine with epinephrine was infiltrated in the supraumbilical area  A small incision was made using 11 blade scalpel  The skin was grasped and elevated using towel clamps  A Veress needle was placed and confirmed to be intraperitoneal using a saline drop test   The abdomen is insufflated to15 mmHg intraperitoneal pressure  A 12 mm trocar was placed  The abdomen was inspected and found to be free of adhesions    An 8 mm robotic trocar was placed in the left lateral abdomen under direct visualization and after the infiltration of local anesthesia  An additional 8 mm robotic trocar was placed in the right lateral abdomen in the same fashion  The patient was slightly placed in Trendelenburg position  Robotic cart was advanced into the operative field and appropriately docked  I then took control at the console  There were adhesions left colon to the anterior abdominal wall  These were taken down using sharp dissection  On evaluation of the inguinal area a left indirect inguinal hernia was noted  An incision was made in the peritoneum superior to the internal inguinal ring  The the preperitoneal space was then dissected inferiorly until Dani's ligament was exposed medially and the transversalis fascia was exposed laterally  The hernia sac was then carefully reduced using blunt and sharp dissection  Care was taken to avoid the spermatic cord vessels  Once the sac was completely reduced dissection was further carried inferiorly  A 3D MID large left-sided mesh was selected  This was introduced into the abdominal cavity  The mesh was then placed into the created pocket  The mesh was found to cover the inguinal hernia defect without difficulty  The mesh was secured medially to Dani's ligament and laterally to the transversalis fascia  The repair appeared to be adequate  The peritoneal incision was closed using a running suture of 2 0 90 day absorbable V lock  The robot was undocked  All trocars were removed and the abdomen was desufflated  Fascia of the 12 mm trocar site was grasped using a Kocher clamp  This was elevated  The fascia was closed using a figure-of-eight suture of 0 vicryl  Skin incisions were closed using 4-0 Vicryl in a subcuticular layer  All incisions were covered with skin glue  The patient tolerated the procedure well was transferred to recovery stable condition  At the end the procedure all needle instrument sponge counts were correct x2        I was present for the entire procedure and A physician assistant was required during the procedure for retraction tissue handling,dissection and suturing    Patient Disposition:  PACU     SIGNATURE: Zac Falcon DO  DATE: November 12, 2021  TIME: 11:46 AM

## 2021-11-12 NOTE — H&P
Date:  11/12/2021    H&P    Referring Provider: Self    CC: Inguinal hernia    HPI: This is a 81-year-old male who presents for discussion inguinal hernia repair  The patient's surgery was delayed due to cardiac stenting last year  He anticipates to be cleared by Cardiology to stop Plavix early next month  The hernia is becoming larger in size  At times he has 6/10 pain  The area is not painful in the morning however it does worsened throughout the day  At times he does have to sit down to relieve the pain  He denies any nausea or vomiting  There are no bowel issues  He does urinate often at night due to an enlarged prostate  He is also scheduled to see Urology for evaluation  History:  Past Medical History:   Diagnosis Date   • Dyslipidemia, goal to be determined 12/14/2009     Past Surgical History:   Procedure Laterality Date   • ANESTHESIA, HEART CATHETERIZATION 11/19/2020   with stent placement   • EXCISE BENIGN LESION, TRUNK, ARM, LEG, 3 1 - 4 0 CM Right 2003   Excision of mole R flank   • FOREARM/WRIST SURGERY NEC Right   fracture   • LAMINOTOMY, SINGLE LUMBAR     Family History   Problem Relation Age of Onset   • Other (breast cancer) Mother   • Other (healthy) Father   • Other (healthy) Brother   • Other (healthy) Daughter   • Other (healthy) Sister   • Other (healthy) Son     Social History     Tobacco Use   • Smoking status: Never Smoker   • Smokeless tobacco: Never Used   Substance Use Topics   • Alcohol use: Yes   Comment: occasionaly   • Drug use: No     Review of patient's allergies indicates: Allergen Reactions   • Amoxicillin Rash   Rash on arms after a dental procedure     Current Outpatient Medications   Medication Sig Dispense Refill   PARoxetine HCl 20 MG Oral Tablet (pAXil) TAKE 1/2 TABLET BY MOUTH EVERY DAY 45 Tab 1   Nitroglycerin 0 3 MG Sublingual Tablet Sublingual (Nitrostat) PLACE 1 TAB UNDER THE TONGUE AS NEEDED FOR PAIN, CHEST  MAY REPEAT 3 TIMES  IF CHEST PAIN CONTINUES, CALL 911  100 Tab 2   Atorvastatin Calcium 40 MG Oral Tablet (LIPITOR) Take 40 mg by mouth daily  Metoprolol Tartrate 25 MG Oral Tablet (LOPRESSOR) Take 25 mg by mouth daily  Aspirin 81 MG Oral Tablet Chewable Take 81 mg by mouth daily  Zoster Vac Recomb Adjuvanted 50 MCG/0 5ML Intramuscular Suspension Reconstituted (Shingrix) Inject 0 5 mL into a large muscle now and repeat dose in 60 to 180 days 1 Each 1   tamsulosin (FLOMAX) 0 4 MG Capsule Take 0 4 mg by mouth daily  fish oil concentrate (OMEGA-3) 1000 MG CAPS Take 1 Cap by mouth daily  30 Cap 5   Saw Palmetto 160 MG CAPS Take one tablet by mouth two times per day       ROS:  Constitutional: Denies weakness and fatigue  ENT: Denies congestion  Resp: Denies shortness of breath  Cardiac: Denies chest pain  GI: Denies abdominal pain, nausea, vomiting  Musculoskeletal: Denies muscle ache  Neuro: Denies headache  Heme: Denies history of bleeding or blood clots  Endo: Denies fever  Skin: Denies skin changes    PE:  Vital Signs: /89   Pulse 69   Temp 98 4 °F (36 9 °C) (Oral)   Resp 18   Ht 6' (1 829 m)   Wt 101 kg (222 lb)   SpO2 99%   BMI 30 11 kg/m²       Gen: No acute distress, appears comfortable  HEENT: Normocephalic, atraumatic  Lungs: Clear to auscultation bilateral  Heart: Regular rate and rhythm, no murmur  Abdomen: Soft, nontender, nondistended  Groin: Positive for reducible left inguinal hernia, I do not appreciate a right inguinal hernia  Ext: No edema  Skin: Warm, dry, intact  Neuro: Awake, alert oriented x3    Labs:  None available    Radiology: None new    Assessment:  72-year-old male with a left inguinal hernia  The patient has been off plavix for 6 days    Plan:   I discussed options with the patient and his wife  Robotic assisted repair with mesh could certainly be completed however a choudhury catheter may have to be placed in order to safely perform the procedure   If the catheter is required, he may have difficulty with postoperative urinary retention due to his history of prostate enlargement  An open procedure could be completed to attempt to avoid urinary catheter placement however, it does carry a longer recovery  The patient has decided to proceed with robotic repair  He does understand the area retention is a possible complication of the procedure  The other risks and benefits of the procedure were discussed with the patient and he expressed understanding  The patient signed consent    Cardiac clearance was obtained

## 2021-11-12 NOTE — DISCHARGE INSTRUCTIONS
Robotic Assisted inguinal hernia repair    DISCHARGE INSTRUCTIONS:   Call Dr Iglesia Sainz office with any questions or concerns at [phone]    Call your local emergency number (911 in the [address]) if:   · You feel lightheaded, short of breath, and have chest pain  · You cough up a large amount of blood  Seek care immediately if:   · Your arm or leg feels warm, tender, and painful  It may look swollen and red  · You have blood clots or fluid around your surgery site  You have pain in your groin or surgery site that does not get better after you take pain medicine  You have trouble urinating  · You suddenly have numbness in your groin area  Call your doctor or surgeon if:   · You are bleeding more than expected from your surgery site  · You have a fever  · Your surgery site is swollen, red, or has pus coming from it  · You have questions or concerns about your condition or care  Medicines:   · Prescription pain medicine prescription strength ibuprofen has been provided  You may alternate this medication with Tylenol  Take Tylenol as directed on the bottle  · Take your medicine as directed  Contact your healthcare provider if you think your medicine is not helping or if you have side effects  Tell him or her if you are allergic to any medicine  Keep a list of the medicines, vitamins, and herbs you take  Include the amounts, and when and why you take them  Bring the list or the pill bottles to follow-up visits  Carry your medicine list with you in case of an emergency  Activity:  Avoid heavy lifting, pushing, or pulling more than 10 lb for 2 weeks  You may walk as much as tolerated  He may also go up stairs  Do not perform any strenuous exercise for 2 weeks  Prevent or manage constipation: It is normal to feel constipated after surgery  If you do not move your bowels for 2 days you may take Colace 100 mg twice a day    If this does not result in a bowel movement, you may take MiraLax as directed on the bottle     Incisions: You have glue covering her incisions  You may shower and pat the incisions dry  Do not scrub over the incisions  Do not soak in a tub or hot tub for 2 weeks  He may apply ice to the incisions as needed for swelling or comfort  He may also apply ice to the groin area  Bruising and swelling of the groin area is expected  Follow up with your doctor or surgeon as directed:  Write down your questions so you remember to ask them during your visits

## 2021-11-15 LAB
ATRIAL RATE: 64 BPM
ATRIAL RATE: 72 BPM
P AXIS: 73 DEGREES
P AXIS: 80 DEGREES
PR INTERVAL: 178 MS
PR INTERVAL: 184 MS
QRS AXIS: 55 DEGREES
QRS AXIS: 69 DEGREES
QRSD INTERVAL: 94 MS
QRSD INTERVAL: 98 MS
QT INTERVAL: 406 MS
QT INTERVAL: 410 MS
QTC INTERVAL: 422 MS
QTC INTERVAL: 444 MS
T WAVE AXIS: 75 DEGREES
T WAVE AXIS: 81 DEGREES
VENTRICULAR RATE: 64 BPM
VENTRICULAR RATE: 72 BPM

## 2021-11-24 ENCOUNTER — HOSPITAL ENCOUNTER (OUTPATIENT)
Dept: ULTRASOUND IMAGING | Facility: HOSPITAL | Age: 71
Discharge: HOME | End: 2021-11-24
Payer: MEDICARE

## 2021-11-24 DIAGNOSIS — Z87.19 PERSONAL HISTORY OF OTHER DISEASES OF THE DIGESTIVE SYSTEM: ICD-10-CM

## 2021-11-24 DIAGNOSIS — Z98.890 OTHER SPECIFIED POSTPROCEDURAL STATES: ICD-10-CM

## 2021-11-24 PROCEDURE — 76870 US EXAM SCROTUM: CPT

## 2022-04-15 PROBLEM — R00.1 BRADYCARDIA: Status: ACTIVE | Noted: 2022-04-15

## 2022-04-15 PROBLEM — R55 NEAR SYNCOPE: Status: ACTIVE | Noted: 2022-04-15

## 2022-04-15 NOTE — ASSESSMENT & PLAN NOTE
Patient notes intermittent episodes of bradycardia with heart rates in the 40s at home  He also notes associated near-syncope with some of these episodes  I have recommended Zio monitor  I have also recommended decreasing his metoprolol succinate from 25 mg to 12 5 mg daily  Will continue close follow-up in the office

## 2022-04-15 NOTE — ASSESSMENT & PLAN NOTE
Coronary Artery Disease:   A  Abnormal stress echocardiogram 11/12/2020 with LAD wall motion abnormality  B  Cardiac catheterization 11/19/2020 showed a 99% stenosed mid LAD lesion which was stented with BLANCA  C  Residual 50% LCx(d) and 50% RtPDA disease  Patient is doing well post stenting  He denies any recurrent chest pain  He has resumed normal activities  Patient declined cardiac rehabilitation as he has been very active since his discharge without limitation  We discussed the importance of DAPT for at least 1 year with aspirin and clopidogrel  We discussed that no one should stop these medications except cardiology  Aspirin will be lifelong  Echocardiogram 2/10/2021 showed normal LVEF of 60% without any significant valvular abnormalities  Continue metoprolol succinate but decrease from 25 mg to 12 5 mg daily do to bradycardia  Continue atorvastatin 40 mg daily  Lipid panel 3/16/2021: C 105  T 78  H 58  L 31  Goal LDL < 70

## 2022-04-15 NOTE — PROGRESS NOTES
Cardiology Office Visit    Randolph Chance  [id number]  1950    [facility]  [address] [facility] [address]  [address]      Dear Rachelle Gardner MD,    I had the pleasure of seeing your patient at our [address] [facility] 3/23/2021  As you know he is a pleasant  male with a medical history as described below  Reason for office visit: Follow up coronary artery disease s/p LAD stent and hyperlipidemia  1  Coronary artery disease involving native coronary artery of native heart without angina pectoris  Assessment & Plan:  Coronary Artery Disease:   A  Abnormal stress echocardiogram 11/12/2020 with LAD wall motion abnormality  B  Cardiac catheterization 11/19/2020 showed a 99% stenosed mid LAD lesion which was stented with BLANCA  C  Residual 50% LCx(d) and 50% RtPDA disease  Patient is doing well post stenting  He denies any recurrent chest pain  He has resumed normal activities  Patient declined cardiac rehabilitation as he has been very active since his discharge without limitation  We discussed the importance of DAPT for at least 1 year with aspirin and clopidogrel  We discussed that no one should stop these medications except cardiology  Aspirin will be lifelong  Echocardiogram 2/10/2021 showed normal LVEF of 60% without any significant valvular abnormalities  Continue metoprolol succinate but decrease from 25 mg to 12 5 mg daily do to bradycardia  Continue atorvastatin 40 mg daily  Lipid panel 3/16/2021: C 105  T 78  H 58  L 31  Goal LDL < 70        2  S/P PTCA (percutaneous transluminal coronary angioplasty)    3  Dyslipidemia  Assessment & Plan:  Lipid panel 3/16/2021: C 105  T 78  H 58  L 31  Continue atorvastatin 40 mg daily  Goal LDL < 70    Repeat lipid panel 3/2022        4  Elevated blood-pressure reading, without diagnosis of hypertension  Assessment & Plan:  Patient denied any history of hypertension however did note intermittent spikes in blood pressure  Blood pressure initially elevated but improved on my personal recheck at initial office visit  Patient is currently on metoprolol succinate 25 mg daily for cardiovascular protection which I am decreasing to 12 5 mg daily due to bradycardia  If blood pressure remains elevated we will need to consider addition of blood pressure medication such as losartan  Patient was asked to keep a log of his blood pressures and bring them to his follow-up visit, which he did, and there are no significantly elevated blood pressures noted (one isolated high reading)  5  Bradycardia  Assessment & Plan:  Patient notes intermittent episodes of bradycardia with heart rates in the 40s at home  He also notes associated near-syncope with some of these episodes  I have recommended Zio monitor  I have also recommended decreasing his metoprolol succinate from 25 mg to 12 5 mg daily  Will continue close follow-up in the office  Orders:  -     AMB extended holter monitor; Future; Expected date: 03/23/2021    6  Near syncope  Assessment & Plan:  See discussion and plan under bradycardia  Orders:  -     AMB extended holter monitor; Future; Expected date: 03/23/2021           HPI   Patient with prior history of hypertension and dyslipidemia which was intermittently treated  Patient had noted onset of chest pain with radiation to his neck and the right side of his jaw primarily with exertion  Stress echocardiogram was done 11/12/2020 which is notable for reproduction of symptoms of anginal chest pain  Frequent PVCs were noted and the patient was noted to have hypokinesis in the LAD territory  Patient underwent elective cardiac catheterization 11/19/2020  Patient was noted to have a 99% mid LAD stenosis just after the 1st diagonal   This was treated with PCI /BLANCA    Residual nonobstructive disease was noted in the distal left circumflex and PDA  He was discharged home on aspirin, clopidogrel, metoprolol succinate and atorvastatin  Patient was seen at [address] [place] [address] 11/24/2020 at which time he was doing well  He declined cardiac rehabilitation as he was very active  He wanted to transfer to cardiology closer to home  1/13/2021: Patient presents to the office today to establish care  In general he has been feeling well  He has had intermittent spikes in his blood pressure since his discharge  He also reports episodes of lightheadedness which seem to occur when blood pressure is low  He denies any recurrent chest pain  He denies any palpitations  He remains active at home without limitation  He has been taking his medications as prescribed  Both he and his wife transitioned to a more plant based diet  3/23/2021:  Patient returns to the office today for follow-up  He does continue to have intermittent lightheadedness  He denies any overt dizziness  He does note that his heart rate at times has dipped into the high 40s  He tells me this can sometimes last for several hours  His last episode of bradycardia was approximately 1 week ago  He tells me he did feel as if he was going to pass out  He denies any palpitations  He denies any chest pain  Blood pressure has been okay  He did bring a list of his blood pressures from home  Blood pressures appear very well controlled with intermittent slow heart rates documented in the low 50s          Patient Active Problem List   Diagnosis    Coronary artery disease involving native coronary artery    S/P coronary artery stent placement    Dyslipidemia    Elevated blood-pressure reading, without diagnosis of hypertension    Localized edema    Pre-op examination    History of PTCA    Hyperlipidemia    HTN (hypertension)    Bradycardia    Near syncope     Past Medical History:   Diagnosis Date    Abnormal stress test     Coronary artery disease     BLANCA to LAD(m) 2020   Depression     Dyslipidemia 2020    History of BPH     Inguinal hernia     Left      Social History     Socioeconomic History    Marital status: /Civil Union     Spouse name: Not on file    Number of children: Not on file    Years of education: Not on file    Highest education level: Not on file   Occupational History    Occupation: Retired   Tobacco Use    Smoking status: Never Smoker    Smokeless tobacco: Never Used   Vaping Use    Vaping Use: Never used   Substance and Sexual Activity    Alcohol use: Yes     Alcohol/week: 1 0 standard drink     Types: 1 Glasses of wine per week     Comment: socially     Drug use: Never    Sexual activity: Yes     Partners: Female   Other Topics Concern    Not on file   Social History Narrative    Not on file     Social Determinants of Health     Financial Resource Strain: Not on file   Food Insecurity: Not on file   Transportation Needs: Not on file   Physical Activity: Not on file   Stress: Not on file   Social Connections: Not on file   Intimate Partner Violence: Not on file   Housing Stability: Not on file      Family History   Problem Relation Age of Onset    Breast cancer Mother          at age 67   Ethelyn Rayle No Known Problems Father     No Known Problems Sister     No Known Problems Brother     No Known Problems Brother     No Known Problems Brother      Past Surgical History:   Procedure Laterality Date    CARDIAC CATHETERIZATION  2020    99% LAD(m) after D1  Residual 50% LCx(d) and 50% RtPDA   CORONARY ANGIOPLASTY WITH STENT PLACEMENT  2020    BLANCA to LAD(m)   LUMBAR DISC SURGERY      WRIST SURGERY Right      * Current medications reviewed  Allergies   Allergen Reactions    Amoxicillin Rash       Cardiac Test Results:     Lipid panel 3/16/2021: C 105  T 78  H 58  L 31  Echocardiogram 2/10/2021:   EF 60%  Mild diastolic dysfunction  No significant valve abnormalities      EKG 2021: Normal sinus rhythm  Nonspecific ST abnormality  Lipid panel 11/20/2020: C 176  T 128  H 51  L 99  Review of Systems:    Review of Systems   Constitutional: Negative for activity change, appetite change and fatigue  HENT: Negative for congestion, hearing loss, tinnitus and trouble swallowing  Eyes: Negative for visual disturbance  Respiratory: Negative for cough, chest tightness, shortness of breath and wheezing  Cardiovascular: Negative for chest pain, palpitations and leg swelling  Gastrointestinal: Negative for abdominal distention, abdominal pain, nausea and vomiting  Genitourinary: Negative for difficulty urinating  Musculoskeletal: Negative for arthralgias  Skin: Negative for rash  Neurological: Positive for light-headedness  Negative for dizziness and syncope  Near syncope   Hematological: Does not bruise/bleed easily  Psychiatric/Behavioral: Negative for confusion  The patient is not nervous/anxious  All other systems reviewed and are negative  Vitals:    03/23/21 1600   BP: 142/88   Pulse: 57   Temp: 97 8 °F (36 6 °C)   SpO2: 98%   Weight: 93 5 kg (206 lb 3 2 oz)   Height: 6' (1 829 m)     Vitals:    03/23/21 1600   Weight: 93 5 kg (206 lb 3 2 oz)     Height: 6' (182 9 cm)     Physical Exam  Vitals reviewed  Constitutional:       Appearance: He is well-developed  HENT:      Head: Normocephalic and atraumatic  Eyes:      Conjunctiva/sclera: Conjunctivae normal       Pupils: Pupils are equal, round, and reactive to light  Neck:      Vascular: No JVD  Cardiovascular:      Rate and Rhythm: Normal rate and regular rhythm  Heart sounds: Normal heart sounds  No murmur heard  No friction rub  No gallop  Pulmonary:      Effort: Pulmonary effort is normal       Breath sounds: Normal breath sounds  Abdominal:      General: Bowel sounds are normal       Palpations: Abdomen is soft  Musculoskeletal:      Cervical back: Normal range of motion     Skin: General: Skin is warm and dry  Neurological:      Mental Status: He is alert and oriented to person, place, and time     Psychiatric:         Behavior: Behavior normal

## 2022-04-15 NOTE — ASSESSMENT & PLAN NOTE
Patient denied any history of hypertension however did note intermittent spikes in blood pressure  Blood pressure initially elevated but improved on my personal recheck at initial office visit  Patient is currently on metoprolol succinate 25 mg daily for cardiovascular protection which I am decreasing to 12 5 mg daily due to bradycardia  If blood pressure remains elevated we will need to consider addition of blood pressure medication such as losartan  Patient was asked to keep a log of his blood pressures and bring them to his follow-up visit, which he did, and there are no significantly elevated blood pressures noted (one isolated high reading)

## 2022-04-15 NOTE — ASSESSMENT & PLAN NOTE
Lipid panel 3/16/2021: C 105  T 78  H 58  L 31  Continue atorvastatin 40 mg daily  Goal LDL < 70  Repeat lipid panel 3/2022

## 2022-04-26 ENCOUNTER — TELEPHONE (OUTPATIENT)
Dept: CARDIOLOGY CLINIC | Facility: CLINIC | Age: 72
End: 2022-04-26

## 2022-04-29 RX ORDER — CHLORAL HYDRATE 500 MG
CAPSULE ORAL
COMMUNITY

## 2022-05-04 ENCOUNTER — OFFICE VISIT (OUTPATIENT)
Dept: CARDIOLOGY CLINIC | Facility: CLINIC | Age: 72
End: 2022-05-04
Payer: MEDICARE

## 2022-05-04 VITALS
DIASTOLIC BLOOD PRESSURE: 80 MMHG | HEIGHT: 72 IN | HEART RATE: 77 BPM | SYSTOLIC BLOOD PRESSURE: 132 MMHG | WEIGHT: 225 LBS | OXYGEN SATURATION: 96 % | BODY MASS INDEX: 30.48 KG/M2

## 2022-05-04 DIAGNOSIS — E78.2 MIXED HYPERLIPIDEMIA: ICD-10-CM

## 2022-05-04 DIAGNOSIS — R03.0 ELEVATED BLOOD-PRESSURE READING, WITHOUT DIAGNOSIS OF HYPERTENSION: ICD-10-CM

## 2022-05-04 DIAGNOSIS — R00.1 BRADYCARDIA: ICD-10-CM

## 2022-05-04 DIAGNOSIS — I25.10 CORONARY ARTERY DISEASE INVOLVING NATIVE CORONARY ARTERY OF NATIVE HEART WITHOUT ANGINA PECTORIS: Primary | ICD-10-CM

## 2022-05-04 DIAGNOSIS — R60.0 LOCALIZED EDEMA: ICD-10-CM

## 2022-05-04 DIAGNOSIS — Z95.5 S/P CORONARY ARTERY STENT PLACEMENT: ICD-10-CM

## 2022-05-04 DIAGNOSIS — R55 NEAR SYNCOPE: ICD-10-CM

## 2022-05-04 PROCEDURE — 99214 OFFICE O/P EST MOD 30 MIN: CPT | Performed by: INTERNAL MEDICINE

## 2022-05-04 PROCEDURE — 1123F ACP DISCUSS/DSCN MKR DOCD: CPT | Performed by: INTERNAL MEDICINE

## 2022-05-04 RX ORDER — ATORVASTATIN CALCIUM 40 MG/1
40 TABLET, FILM COATED ORAL
Qty: 90 TABLET | Refills: 3 | Status: SHIPPED | OUTPATIENT
Start: 2022-05-04

## 2022-05-04 NOTE — PROGRESS NOTES
Cardiology Office Visit    Mariia Matos  [id number]  1950    [facility]  [address]  [address]      Dear Roque Sahu MD,    I had the pleasure of seeing your patient at our [address] [facility] [place] office today 5/4/2022  As you know he is a pleasant 80 y o  male with a medical history as described below  Reason for office visit: Follow up coronary artery disease s/p LAD stent and hyperlipidemia  1  Coronary artery disease involving native coronary artery of native heart without angina pectoris  Assessment & Plan:  Coronary Artery Disease:   A  Abnormal stress echocardiogram 11/12/2020 with LAD wall motion abnormality  B  Cardiac catheterization 11/19/2020 showed a 99% stenosed mid LAD lesion which was stented with BLANCA  C  Residual 50% LCx(d) and 50% RtPDA disease  Patient is doing well post stenting  He denies any recurrent chest pain  Patient declined cardiac rehabilitation as he had been very active since his discharge without limitation  He completed 1 year of DAPT with aspirin and clopidogrel  Clopidogrel was stopped a few days prior to his hernia repair  He understands he will need aspirin 81 mg daily lifelong  Echocardiogram 2/10/2021 showed normal LVEF of 60% without any significant valvular abnormalities  Continue metoprolol succinate 12 5 mg daily do to bradycardia  Continue atorvastatin 40 mg daily  Lipid panel 4/18/2022: C 134  T 128  H 48  L 60  Goal LDL < 70        2  S/P coronary artery stent placement  Assessment & Plan:  See discussion under CAD    Orders:  -     atorvastatin (LIPITOR) 40 mg tablet; Take 1 tablet (40 mg total) by mouth daily with dinner    3  Mixed hyperlipidemia  Assessment & Plan:  Lipid panel 4/18/2022: C 134  T 128  H 48  L 60  Continue atorvastatin 40 mg daily  Goal LDL < 70    Repeat lipid panel 4/2023        4  Elevated blood-pressure reading, without diagnosis of hypertension  Assessment & Plan:  Patient denied any history of hypertension however did note intermittent spikes in blood pressure  Blood pressure initially elevated but improved on my personal recheck at initial office visit and again today (132/80 on my check)  Patient is currently on metoprolol succinate 12 5 mg daily (dose lowered due to bradycardia)  If blood pressure remains elevated we will need to consider addition of blood pressure medication such as losartan  Patient was asked to continue to monitor blood pressure intermittently  5  Bradycardia  Assessment & Plan:  Patient previously noted intermittent episodes of bradycardia with heart rates in the 40s at home  He also noted associated near-syncope with some of these episodes  ZIO 3/2021 showed an average heart rate of 64 bpm (other details as outlined in results)  I had also recommended decreasing his metoprolol succinate to 12 5 mg daily  No recurrent bradycardia noted  6  Near syncope  Assessment & Plan:  See discussion and plan under bradycardia  No recurrence  7  Localized edema  Assessment & Plan:  Patient had and continues to note intermittent lower extremity edema  Echocardiogram 2/2021 with EF 60% and grade 1 diastolic dysfunction  No shortness of breath/dyspnea on exertion  Patient was given furosemide 20mg daily for PRN use for leg swelling by CRNP  He does note leg heaviness and I suspect he has a component of venous insufficiency  Can consider VI testing moving forward  I did recommend he elevate legs when sitting and also that he try to wear compression socks (15-20 mmHg)  HPI   Patient with prior history of hypertension and dyslipidemia which was intermittently treated  Patient had noted onset of chest pain with radiation to his neck and the right side of his jaw primarily with exertion    Stress echocardiogram was done 11/12/2020 which is notable for reproduction of symptoms of anginal chest pain  Frequent PVCs were noted and the patient was noted to have hypokinesis in the LAD territory  Patient underwent elective cardiac catheterization 11/19/2020  Patient was noted to have a 99% mid LAD stenosis just after the 1st diagonal   This was treated with PCI /BLANCA  Residual nonobstructive disease was noted in the distal left circumflex and PDA  He was discharged home on aspirin, clopidogrel, metoprolol succinate and atorvastatin  Patient was seen at [address] [place] [address] 11/24/2020 at which time he was doing well  He declined cardiac rehabilitation as he was very active  He wanted to transfer to cardiology closer to home  1/13/2021: Patient presents to the office today to establish care  In general he has been feeling well  He has had intermittent spikes in his blood pressure since his discharge  He also reports episodes of lightheadedness which seem to occur when blood pressure is low  He denies any recurrent chest pain  He denies any palpitations  He remains active at home without limitation  He has been taking his medications as prescribed  Both he and his wife transitioned to a more plant based diet  3/23/2021:  Patient returns to the office today for follow-up  He does continue to have intermittent lightheadedness  He denies any overt dizziness  He does note that his heart rate at times has dipped into the high 40s  He tells me this can sometimes last for several hours  His last episode of bradycardia was approximately 1 week ago  He tells me he did feel as if he was going to pass out  He denies any palpitations  He denies any chest pain  Blood pressure has been okay  He did bring a list of his blood pressures from home  Blood pressures appear very well controlled with intermittent slow heart rates documented in the low 50s  * Patient was seen by Sabrina Reno 11/3/2021 for preoperative clearance for hernia repair   His clopidogrel was stopped for surgery and not resumed as he had completed 1 year of DAPT  5/4/2022: Patient returns to the office today follow-up  He is doing very well since I last saw him  He denies any chest pain  He denies any shortness of breath  No palpitations  He and his wife are walking 2-3 miles a day  No bradycardia  Lipid panel reviewed from 4/18/2022 was perfect with an LDL of 60  He does note some leg heaviness with ambulation  Brittany Harris is still in a bit of shock over his diagnosis of coronary artery disease  We spent a good deal of time discussing his concerns (totally valid concerns) and that we now know what we are dealing with and will continue to do all we can to halt progression of residual coronary disease and to keep his stent open  I assured him that we are doing all we can to keep him from having recurrent problems: his cholesterol and blood pressure are perfect, he is walking daily and they are trying to make better dietary choices and we have him a good regimen of cardiac medications  Patient Active Problem List   Diagnosis    Coronary artery disease involving native coronary artery    S/P coronary artery stent placement    Mixed hyperlipidemia    Elevated blood-pressure reading, without diagnosis of hypertension    Localized edema    History of PTCA    Bradycardia    Near syncope     Past Medical History:   Diagnosis Date    Abnormal stress test     Coronary artery disease     BLANCA to LAD(m) 11/19/2020      Depression     Dyslipidemia 11/19/2020    History of BPH     Inguinal hernia     Left      Social History     Socioeconomic History    Marital status: /Civil Union     Spouse name: Not on file    Number of children: Not on file    Years of education: Not on file    Highest education level: Not on file   Occupational History    Occupation: Retired   Tobacco Use    Smoking status: Never Smoker    Smokeless tobacco: Never Used   Vaping Use    Vaping Use: Never used Substance and Sexual Activity    Alcohol use: Yes     Alcohol/week: 1 0 standard drink     Types: 1 Glasses of wine per week     Comment: socially     Drug use: Never    Sexual activity: Yes     Partners: Female   Other Topics Concern    Not on file   Social History Narrative    Not on file     Social Determinants of Health     Financial Resource Strain: Not on file   Food Insecurity: Not on file   Transportation Needs: Not on file   Physical Activity: Not on file   Stress: Not on file   Social Connections: Not on file   Intimate Partner Violence: Not on file   Housing Stability: Not on file      Family History   Problem Relation Age of Onset    Breast cancer Mother          at age 67   [place] No Known Problems Father     No Known Problems Sister     No Known Problems Brother     No Known Problems Brother     No Known Problems Brother      Past Surgical History:   Procedure Laterality Date    CARDIAC CATHETERIZATION  2020    99% LAD(m) after D1  Residual 50% LCx(d) and 50% RtPDA   CORONARY ANGIOPLASTY WITH STENT PLACEMENT  2020    BLANCA to LAD(m)     [address] SUR[address]ERY      NV LAP,INGUINAL HERNIA REPR,INITIAL Left 2021    Procedure: ROBOTIC ASSISTED LAPAROSCOPIC INGUINAL HERNIA REPAIR WITH MESH;  Surgeon: Jaquelin Napier DO;  Location:  MAIN OR;  Service: General    WRIST SURGERY Right          Current Outpatient Medications:     aspirin (ECOTRIN LOW STRENGTH) 81 mg EC tablet, Take 81 mg by mouth daily Pt was to continue taking d/t heart stents, Disp: , Rfl:     atorvastatin (LIPITOR) 40 mg tablet, Take 1 tablet (40 mg total) by mouth daily with dinner, Disp: 90 tablet, Rfl: 3    metoprolol succinate (TOPROL-XL) 25 mg 24 hr tablet, Take 0 5 tablets (12 5 mg total) by mouth daily at bedtime, Disp: 45 tablet, Rfl: 3    Misc Natural Products (PROSTATE SUPPORT PO), Take 3 tablets by mouth daily, Disp: , Rfl:     nitroglycerin (NITROSTAT) 0 4 mg SL tablet, Place 0 4 mg under the tongue every 5 (five) minutes as needed for chest pain  , Disp: , Rfl:     Omega-3 Fatty Acids (fish oil) 1,000 mg, Fish Oil, Disp: , Rfl:     tamsulosin (Flomax) 0 4 mg, Take 0 4 mg by mouth daily with dinner, Disp: , Rfl:     Omega-3 Fatty Acids (Fish Oil) 1200 MG CAPS, daily  (Patient not taking: Reported on 5/4/2022 ), Disp: , Rfl:     Allergies   Allergen Reactions    Amoxicillin Rash       Cardiac Test Results:     Lipid panel 4/18/2022: C 134  T 128  H 48  L 60  Lipid panel 3/16/2021: C 105  T 78  H 58  L 31  Echocardiogram 2/10/2021:   EF 60%  Mild diastolic dysfunction  No significant valve abnormalities  EKG 01/13/2021: Normal sinus rhythm  Nonspecific ST abnormality  Lipid panel 11/20/2020: C 176  T 128  H 51  L 99  Review of Systems:    Review of Systems   Constitutional: Negative for activity change, appetite change and fatigue  HENT: Negative for congestion, hearing loss, tinnitus and trouble swallowing  Eyes: Negative for visual disturbance  Respiratory: Negative for cough, chest tightness, shortness of breath and wheezing  Cardiovascular: Negative for chest pain, palpitations and leg swelling  Gastrointestinal: Negative for abdominal distention, abdominal pain, nausea and vomiting  Genitourinary: Negative for difficulty urinating  Musculoskeletal: Negative for arthralgias  Leg heaviness   Skin: Negative for rash  Neurological: Negative for dizziness, syncope and light-headedness  Hematological: Does not bruise/bleed easily  Psychiatric/Behavioral: Negative for confusion  The patient is not nervous/anxious  All other systems reviewed and are negative          Vitals:    05/04/22 1433 05/04/22 1535   BP: 140/82 132/80   BP Location: Left arm Left arm   Patient Position: Sitting Sitting   Cuff Size: Standard    Pulse: 77    SpO2: 96%    Weight: 102 kg (225 lb)    Height: 6' (1 829 m)      Vitals:    05/04/22 1433   Weight: 102 kg (225 lb) Height: 6' (182 9 cm)     Physical Exam  Vitals reviewed  Constitutional:       Appearance: He is well-developed  HENT:      Head: Normocephalic and atraumatic  Eyes:      Conjunctiva/sclera: Conjunctivae normal       Pupils: Pupils are equal, round, and reactive to light  Neck:      Vascular: No JVD  Cardiovascular:      Rate and Rhythm: Normal rate and regular rhythm  Heart sounds: Normal heart sounds  No murmur heard  No friction rub  No gallop  Pulmonary:      Effort: Pulmonary effort is normal       Breath sounds: Normal breath sounds  Abdominal:      General: Bowel sounds are normal       Palpations: Abdomen is soft  Musculoskeletal:      Cervical back: Normal range of motion  Right lower leg: Edema present  Left lower leg: Edema (L>R) present  Skin:     General: Skin is warm and dry  Neurological:      Mental Status: He is alert and oriented to person, place, and time     Psychiatric:         Behavior: Behavior normal

## 2022-05-04 NOTE — PATIENT INSTRUCTIONS
Cholesterol was perfect  LDL 60  Call me with any change in symptoms  Up to date with cardiac testing

## 2022-05-05 PROBLEM — E78.2 MIXED HYPERLIPIDEMIA: Status: ACTIVE | Noted: 2020-11-19

## 2022-05-05 PROBLEM — Z01.818 PRE-OP EXAMINATION: Status: RESOLVED | Noted: 2021-11-04 | Resolved: 2022-05-05

## 2022-05-05 NOTE — ASSESSMENT & PLAN NOTE
Lipid panel 4/18/2022: C 134  T 128  H 48  L 60  Continue atorvastatin 40 mg daily  Goal LDL < 70  Repeat lipid panel 4/2023

## 2022-05-05 NOTE — ASSESSMENT & PLAN NOTE
Coronary Artery Disease:   A  Abnormal stress echocardiogram 11/12/2020 with LAD wall motion abnormality  B  Cardiac catheterization 11/19/2020 showed a 99% stenosed mid LAD lesion which was stented with BLANCA  C  Residual 50% LCx(d) and 50% RtPDA disease  Patient is doing well post stenting  He denies any recurrent chest pain  Patient declined cardiac rehabilitation as he had been very active since his discharge without limitation  He completed 1 year of DAPT with aspirin and clopidogrel  Clopidogrel was stopped a few days prior to his hernia repair  He understands he will need aspirin 81 mg daily lifelong  Echocardiogram 2/10/2021 showed normal LVEF of 60% without any significant valvular abnormalities  Continue metoprolol succinate 12 5 mg daily do to bradycardia  Continue atorvastatin 40 mg daily  Lipid panel 4/18/2022: C 134  T 128  H 48  L 60  Goal LDL < 70

## 2022-05-05 NOTE — ASSESSMENT & PLAN NOTE
Patient denied any history of hypertension however did note intermittent spikes in blood pressure  Blood pressure initially elevated but improved on my personal recheck at initial office visit and again today (132/80 on my check)  Patient is currently on metoprolol succinate 12 5 mg daily (dose lowered due to bradycardia)  If blood pressure remains elevated we will need to consider addition of blood pressure medication such as losartan  Patient was asked to continue to monitor blood pressure intermittently

## 2022-05-05 NOTE — ASSESSMENT & PLAN NOTE
Patient previously noted intermittent episodes of bradycardia with heart rates in the 40s at home  He also noted associated near-syncope with some of these episodes  ZIO 3/2021 showed an average heart rate of 64 bpm (other details as outlined in results)  I had also recommended decreasing his metoprolol succinate to 12 5 mg daily  No recurrent bradycardia noted

## 2022-07-13 ENCOUNTER — OFFICE VISIT (OUTPATIENT)
Dept: CARDIOLOGY CLINIC | Facility: CLINIC | Age: 72
End: 2022-07-13
Payer: MEDICARE

## 2022-07-13 VITALS
HEIGHT: 72 IN | HEART RATE: 60 BPM | WEIGHT: 226.2 LBS | OXYGEN SATURATION: 99 % | DIASTOLIC BLOOD PRESSURE: 88 MMHG | BODY MASS INDEX: 30.64 KG/M2 | SYSTOLIC BLOOD PRESSURE: 136 MMHG

## 2022-07-13 DIAGNOSIS — Z95.5 S/P CORONARY ARTERY STENT PLACEMENT: ICD-10-CM

## 2022-07-13 DIAGNOSIS — R55 NEAR SYNCOPE: ICD-10-CM

## 2022-07-13 DIAGNOSIS — I25.10 CORONARY ARTERY DISEASE INVOLVING NATIVE CORONARY ARTERY OF NATIVE HEART WITHOUT ANGINA PECTORIS: ICD-10-CM

## 2022-07-13 DIAGNOSIS — Z01.810 PREOP CARDIOVASCULAR EXAM: Primary | ICD-10-CM

## 2022-07-13 DIAGNOSIS — R00.1 BRADYCARDIA: ICD-10-CM

## 2022-07-13 DIAGNOSIS — E78.2 MIXED HYPERLIPIDEMIA: ICD-10-CM

## 2022-07-13 PROCEDURE — 93000 ELECTROCARDIOGRAM COMPLETE: CPT | Performed by: NURSE PRACTITIONER

## 2022-07-13 PROCEDURE — 99214 OFFICE O/P EST MOD 30 MIN: CPT | Performed by: NURSE PRACTITIONER

## 2022-07-13 RX ORDER — DIINDOLYLMETHANE 100 %
POWDER (GRAM) MISCELLANEOUS DAILY
COMMUNITY

## 2022-07-13 RX ORDER — NITROGLYCERIN 0.4 MG/1
0.4 TABLET SUBLINGUAL
Qty: 15 TABLET | Refills: 0 | Status: SHIPPED | OUTPATIENT
Start: 2022-07-13

## 2022-07-13 NOTE — PATIENT INSTRUCTIONS
EKG today shows no changes  Blood pressure is improved on my recheck  Monitor periodically at home, and notify us if persistently   140/90  You can bring your cuff along to your next appointment  No limitations for surgery  Please continue aspirin and metoprolol during the pre and post operative period  Contact us with any concerns  Use compression socks 15-20 mmHg compression

## 2022-07-13 NOTE — PROGRESS NOTES
Cardiology Follow Up    Lenin Navarro  1950  [id number]  [facility] [place] [address] [facility] [address]  [address]  [phone]    Azael Garibay presents today for pre-operative cardiac evaluation in preparation for surgery  1  Preop cardiovascular exam  Assessment & Plan:  Patient presents for pre-operative examination in preparation for laser prostate resection with possible TURP scheduled for 8/10/2022 with Dr Glendy Nguyen at [facility]  He is able to perform > 4 METs  RCRI score of 1 with 6% 30-day risk of death, MI, or cardiac arrest   ECG today shows sinus bradycardia, rate 58 bpm   He is optimized from a cardiac standpoint to undergo surgery  He should continue aspirin 81 mg yoan-operatively given presence of coronary stents  Continue metoprolol succinate 12 5 mg daily perioperatively  Recommend pre and post op ECG  Orders:  -     POCT ECG    2  Coronary artery disease involving native coronary artery of native heart without angina pectoris  Assessment & Plan:  Coronary Artery Disease:   A  Abnormal stress echocardiogram 11/12/2020 with LAD wall motion abnormality  B  Cardiac catheterization 11/19/2020 showed a 99% stenosed mid LAD lesion which was stented with BLANCA  C  Residual 50% LCx(d) and 50% RtPDA disease  - - - - - - - -  Patient is doing well post stenting  He denies any recurrent chest pain  He completed 1 year of DAPT with aspirin and clopidogrel  Clopidogrel was stopped a few days prior to his hernia repair  Continue aspirin 81 mg daily lifelong  Echocardiogram 2/10/2021 showed normal LVEF of 60% without any significant valvular abnormalities  Continue metoprolol succinate 12 5 mg daily, reduced secondary to bradycardia  Continue atorvastatin 40 mg daily for goal LDL < 70       Orders:  -     nitroglycerin (NITROSTAT) 0 4 mg SL tablet; Place 1 tablet (0 4 mg total) under the tongue every 5 (five) minutes as needed for chest pain    3  S/P coronary artery stent placement  Assessment & Plan:  See discussion under CAD      4  Bradycardia  Assessment & Plan:  Patient previously noted intermittent episodes of bradycardia with heart rates in the 40s at home  He also noted associated near-syncope with some of these episodes  ZIO 3/2021 showed an average heart rate of 64 bpm (other details as outlined in results)  Metoprolol succinate was decreased to 12 5 mg daily  No recurrent bradycardia noted  5  Near syncope  Assessment & Plan:  See discussion and plan under bradycardia  No recurrence  6  Mixed hyperlipidemia  Assessment & Plan:  Lipid panel 4/18/2022: C 134  T 128  H 48  L 60  Continue atorvastatin 40 mg daily  Goal LDL < 70  Repeat lipid panel 4/2023  HPI  Tomy Osorio has a past medical history of CAD s/p BLANCA 11/19/2020, HLD, depression, BPH, and left inguinal hernia      He initially established with [facility] after undergoing an abnormal stress echocardiogram on 11/12/2020 for symptoms of chest pain radiating to his jaw with exertion  He was sent for elective cardiac catheterization performed at [facility] on 11/19/2020 with full details below      He has done well post PCI  His metoprolol succinate was reduced to 12 5mg in the spring due to c/o lightheadedness with resolution of symptoms  BP was well controlled at previous office visit in July 2021  He underwent left inguinal hernia repair in November 2021  He met with me on 11/3/21 for pre-operative cardiac evaluation at which time he was experiencing activity limitation due to his hernia and groin pain  He denied any cardiac complaints  He met with Dr Dillard most recently on 5/4/2022 at which time he was doing very well  He was walking 2-3 miles per day with some leg heaviness      7/13/2022: Tomy Osorio presents today for pre-operative cardiac risk assessment in preparation for cystoscopy with laser prostate surgery with possible TURP scheduled for 8/10/2022 with Dr Jessica Lucas at [facility]  He tells me that he is doing very well  He remains active, going for several walks per week and tolerating well  He reports some leg heaviness but no significant swelling  He denies any recurrent chest pain, shortness of breath/dyspnea on exertion  No significant fatigue or recurrent near syncope  He is taking his medications as prescribed  No unusual bleeding/bruising  ECG today shows sinus bradycardia, rate 58 bpm      Medical Problems     Problem List     Coronary artery disease involving native coronary artery    S/P coronary artery stent placement    Mixed hyperlipidemia    Elevated blood-pressure reading, without diagnosis of hypertension    Localized edema    History of PTCA    Bradycardia    Near syncope        Past Medical History:   Diagnosis Date    Abnormal stress test     Coronary artery disease     BLANCA to LAD(m) 11/19/2020   Depression     Dyslipidemia 11/19/2020    History of BPH     Inguinal hernia     Left      Social History     Socioeconomic History    Marital status: /Civil Union     Spouse name: Not on file    Number of children: Not on file    Years of education: Not on file    Highest education level: Not on file   Occupational History    Occupation: Retired   Tobacco Use    Smoking status: Never Smoker    Smokeless tobacco: Never Used   Vaping Use    Vaping Use: Never used   Substance and Sexual Activity    Alcohol use:  Yes     Alcohol/week: 1 0 standard drink     Types: 1 Glasses of wine per week     Comment: socially     Drug use: Never    Sexual activity: Yes     Partners: Female   Other Topics Concern    Not on file   Social History Narrative    Not on file     Social Determinants of Health     Financial Resource Strain: Not on file   Food Insecurity: Not on file   Transportation Needs: Not on file   Physical Activity: Not on file   Stress: Not on file   Social Connections: Not on file   Intimate Partner Violence: Not on file   Housing Stability: Not on file      Family History   Problem Relation Age of Onset   Mavis Cousin Breast cancer Mother          at age 67   Mavis Cousin No Known Problems Father     No Known Problems Sister     No Known Problems Brother     No Known Problems Brother     No Known Problems Brother      Past Surgical History:   Procedure Laterality Date    CARDIAC CATHETERIZATION  2020    99% LAD(m) after D1  Residual 50% LCx(d) and 50% RtPDA   CORONARY ANGIOPLASTY WITH STENT PLACEMENT  2020    BLANCA to LAD(m)      LUMBAR DISC SURGERY      MI LAP,INGUINAL HERNIA REPR,INITIAL Left 2021    Procedure: ROBOTIC ASSISTED LAPAROSCOPIC INGUINAL HERNIA REPAIR WITH MESH;  Surgeon: Flores Quintero DO;  Location:  MAIN OR;  Service: General    PROSTATE SURGERY      laser core    WRIST SURGERY Right        Current Outpatient Medications:     aspirin (ECOTRIN LOW STRENGTH) 81 mg EC tablet, Take 81 mg by mouth daily Pt was to continue taking d/t heart stents, Disp: , Rfl:     atorvastatin (LIPITOR) 40 mg tablet, Take 1 tablet (40 mg total) by mouth daily with dinner, Disp: 90 tablet, Rfl: 3    Diindolylmethane POWD, Use daily, Disp: , Rfl:     metoprolol succinate (TOPROL-XL) 25 mg 24 hr tablet, Take 0 5 tablets (12 5 mg total) by mouth daily at bedtime, Disp: 45 tablet, Rfl: 3    Misc Natural Products (PROSTATE SUPPORT PO), Take 3 tablets by mouth daily, Disp: , Rfl:     nitroglycerin (NITROSTAT) 0 4 mg SL tablet, Place 1 tablet (0 4 mg total) under the tongue every 5 (five) minutes as needed for chest pain, Disp: 15 tablet, Rfl: 0    Omega-3 Fatty Acids (fish oil) 1,000 mg, Fish Oil, Disp: , Rfl:     tamsulosin (FLOMAX) 0 4 mg, Take 0 4 mg by mouth daily with dinner, Disp: , Rfl:   Allergies   Allergen Reactions    Amoxicillin Rash       Labs:     Chemistry        Component Value Date/Time    K 4 2 2021 0932     2021 0932    CO2 29 2021 0932    BUN 22 2021 0932 CREATININE 1 07 11/04/2021 0932        Component Value Date/Time    CALCIUM 9 0 11/04/2021 0932    ALKPHOS 59 11/16/2020 0856    AST 16 11/16/2020 0856    ALT 23 11/16/2020 0856        Cardiac Test Results:   ECG 7/13/22: Sinus bradycardia, rate 58 bpm      Lipid panel 4/18/2022: C 134  T 128  H 48  L 60       Echocardiogram 2/10/2021:   EF 60%  Mild diastolic dysfunction  No significant valve abnormalities      EKG 01/13/2021: Normal sinus rhythm  Nonspecific ST abnormality      Review of Systems   Constitutional: Negative  HENT: Negative  Cardiovascular: Negative for chest pain, dyspnea on exertion, irregular heartbeat, leg swelling, near-syncope, orthopnea and palpitations  Respiratory: Negative for cough and snoring  Endocrine: Negative  Skin: Negative  Musculoskeletal: Negative  Gastrointestinal: Negative  Genitourinary:        Issues with prostate, leading to surgical correction   Neurological: Negative  Psychiatric/Behavioral: Negative  Vitals:    07/13/22 1016   BP: 136/88   Pulse: 60   SpO2:      Vitals:    07/13/22 0941   Weight: 103 kg (226 lb 3 2 oz)     Height: 6' (182 9 cm)   Body mass index is 30 68 kg/m²  Physical Exam  Vitals and nursing note reviewed  Constitutional:       General: He is not in acute distress  Appearance: He is well-developed  He is obese  He is not diaphoretic  HENT:      Head: Normocephalic and atraumatic  Neck:      Vascular: No carotid bruit or JVD  Cardiovascular:      Rate and Rhythm: Regular rhythm  Bradycardia present  Pulses: Intact distal pulses  Heart sounds: Normal heart sounds, S1 normal and S2 normal  No murmur heard  No friction rub  No gallop  Comments: No lower extremity edema  Pulmonary:      Effort: Pulmonary effort is normal  No respiratory distress  Breath sounds: Normal breath sounds  Abdominal:      General: There is no distension  Palpations: Abdomen is soft        Tenderness: There is no abdominal tenderness  Skin:     General: Skin is warm and dry  Findings: No rash  Neurological:      Mental Status: He is alert and oriented to person, place, and time  Psychiatric:         Mood and Affect: Mood and affect normal          Speech: Speech normal          Behavior: Behavior normal  Behavior is cooperative           Cognition and Memory: Cognition and memory normal

## 2022-07-18 PROBLEM — Z01.810 PREOP CARDIOVASCULAR EXAM: Status: ACTIVE | Noted: 2022-07-18

## 2022-07-18 NOTE — ASSESSMENT & PLAN NOTE
Patient previously noted intermittent episodes of bradycardia with heart rates in the 40s at home  He also noted associated near-syncope with some of these episodes  ZIO 3/2021 showed an average heart rate of 64 bpm (other details as outlined in results)  Metoprolol succinate was decreased to 12 5 mg daily  No recurrent bradycardia noted

## 2022-07-18 NOTE — ASSESSMENT & PLAN NOTE
Patient presents for pre-operative examination in preparation for laser prostate resection with possible TURP scheduled for 8/10/2022 with Dr Michele Reaz at [facility]  He is able to perform > 4 METs  RCRI score of 1 with 6% 30-day risk of death, MI, or cardiac arrest   ECG today shows sinus bradycardia, rate 58 bpm   He is optimized from a cardiac standpoint to undergo surgery  He should continue aspirin 81 mg yoan-operatively given presence of coronary stents  Continue metoprolol succinate 12 5 mg daily perioperatively  Recommend pre and post op ECG

## 2022-07-18 NOTE — ASSESSMENT & PLAN NOTE
Coronary Artery Disease:   A  Abnormal stress echocardiogram 11/12/2020 with LAD wall motion abnormality  B  Cardiac catheterization 11/19/2020 showed a 99% stenosed mid LAD lesion which was stented with BLANCA  C  Residual 50% LCx(d) and 50% RtPDA disease  - - - - - - - -  Patient is doing well post stenting  He denies any recurrent chest pain  He completed 1 year of DAPT with aspirin and clopidogrel  Clopidogrel was stopped a few days prior to his hernia repair  Continue aspirin 81 mg daily lifelong  Echocardiogram 2/10/2021 showed normal LVEF of 60% without any significant valvular abnormalities  Continue metoprolol succinate 12 5 mg daily, reduced secondary to bradycardia  Continue atorvastatin 40 mg daily for goal LDL < 70

## 2022-10-23 ENCOUNTER — OFFICE VISIT (OUTPATIENT)
Dept: URGENT CARE | Facility: CLINIC | Age: 72
End: 2022-10-23
Payer: MEDICARE

## 2022-10-23 VITALS
DIASTOLIC BLOOD PRESSURE: 87 MMHG | BODY MASS INDEX: 30.61 KG/M2 | SYSTOLIC BLOOD PRESSURE: 154 MMHG | WEIGHT: 226 LBS | HEART RATE: 64 BPM | OXYGEN SATURATION: 97 % | RESPIRATION RATE: 16 BRPM | HEIGHT: 72 IN | TEMPERATURE: 98 F

## 2022-10-23 DIAGNOSIS — J02.9 PHARYNGITIS, UNSPECIFIED ETIOLOGY: Primary | ICD-10-CM

## 2022-10-23 LAB — S PYO AG THROAT QL: NEGATIVE

## 2022-10-23 PROCEDURE — 87880 STREP A ASSAY W/OPTIC: CPT | Performed by: PHYSICIAN ASSISTANT

## 2022-10-23 PROCEDURE — 99213 OFFICE O/P EST LOW 20 MIN: CPT | Performed by: PHYSICIAN ASSISTANT

## 2022-10-23 PROCEDURE — G0463 HOSPITAL OUTPT CLINIC VISIT: HCPCS | Performed by: PHYSICIAN ASSISTANT

## 2022-10-23 RX ORDER — AZITHROMYCIN 250 MG/1
TABLET, FILM COATED ORAL
Qty: 6 TABLET | Refills: 0 | Status: SHIPPED | OUTPATIENT
Start: 2022-10-23 | End: 2022-10-27

## 2022-10-23 NOTE — PROGRESS NOTES
[facility]        NAME: Luigi Swift is a 71 y o  male  : 1950    MRN: [id number]  DATE: 2022  TIME: 12:38 PM    Assessment and Plan   Pharyngitis, unspecified etiology [J02 9]  1  Pharyngitis, unspecified etiology  POCT rapid strepA    azithromycin (ZITHROMAX) 250 mg tablet         Patient Instructions   Take antibiotics as prescribed  Complete full dose even if symptoms began to improve  Over-the-counter Tylenol ibuprofen  Follow up with PCP in 3-5 days  Proceed to  ER if symptoms worsen  Chief Complaint     Chief Complaint   Patient presents with   • Sore Throat     Sore throat x 1 week  Feels tired         History of Present Illness       Patient is a 36-year-old male with significant past medical history of hyperlipidemia and coronary artery disease presents the office complaining of sore throat for 1 week  Also admits to fatigue, congestion, postnasal drip, and cough  Throat pain is rated 5/10  His wife is currently sick with similar symptoms  Denies SOB, CP, nausea, vomiting, abdominal pain or rashes  He has been trying various over-the-counter cold and flu medications but states symptoms are getting worse  Review of Systems   Review of Systems   Constitutional: Positive for fatigue  Negative for chills and fever  HENT: Positive for sore throat and voice change  Negative for congestion, postnasal drip and rhinorrhea  Respiratory: Positive for cough  Negative for shortness of breath  Cardiovascular: Negative for chest pain  Gastrointestinal: Negative for abdominal pain, diarrhea, nausea and vomiting  Musculoskeletal: Negative for myalgias  Neurological: Positive for headaches  Negative for dizziness and light-headedness           Current Medications       Current Outpatient Medications:   •  aspirin (ECOTRIN LOW STRENGTH) 81 mg EC tablet, Take 81 mg by mouth daily Pt was to continue taking d/t heart stents, Disp: , Rfl:   •  atorvastatin (LIPITOR) 40 mg tablet, Take 1 tablet (40 mg total) by mouth daily with dinner, Disp: 90 tablet, Rfl: 3  •  azithromycin (ZITHROMAX) 250 mg tablet, Take 2 tablets today then 1 tablet daily x 4 days, Disp: 6 tablet, Rfl: 0  •  metoprolol succinate (TOPROL-XL) 25 mg 24 hr tablet, Take 0 5 tablets (12 5 mg total) by mouth daily at bedtime, Disp: 45 tablet, Rfl: 3  •  nitroglycerin (NITROSTAT) 0 4 mg SL tablet, Place 1 tablet (0 4 mg total) under the tongue every 5 (five) minutes as needed for chest pain, Disp: 15 tablet, Rfl: 0  •  Omega-3 Fatty Acids (fish oil) 1,000 mg, Fish Oil, Disp: , Rfl:   •  Diindolylmethane POWD, Use daily, Disp: , Rfl:   •  Misc Natural Products (PROSTATE SUPPORT PO), Take 3 tablets by mouth daily, Disp: , Rfl:   •  tamsulosin (FLOMAX) 0 4 mg, Take 0 4 mg by mouth daily with dinner, Disp: , Rfl:     Current Allergies     Allergies as of 10/23/2022 - Reviewed 10/23/2022   Allergen Reaction Noted   • Amoxicillin Rash 11/19/2020            The following portions of the patient's history were reviewed and updated as appropriate: allergies, current medications, past family history, past medical history, past social history, past surgical history and problem list      Past Medical History:   Diagnosis Date   • Abnormal stress test    • Coronary artery disease     BLANCA to LAD(m) 11/19/2020  • Depression    • Dyslipidemia 11/19/2020   • History of BPH    • Inguinal hernia     Left        Past Surgical History:   Procedure Laterality Date   • CARDIAC CATHETERIZATION  11/19/2020    99% LAD(m) after D1  Residual 50% LCx(d) and 50% RtPDA  • CORONARY ANGIOPLASTY WITH STENT PLACEMENT  11/19/2020    BLANCA to LAD(m)     • HERNIA REPAIR     • LUMBAR DISC SURGERY     • [place] LAP,INGUINAL HERNIA REPR,INITIAL Left 11/12/2021    Procedure: ROBOTIC ASSISTED LAPAROSCOPIC INGUINAL HERNIA REPAIR WITH MESH;  Surgeon: Claude Bland DO;  Location:  MAIN OR;  Service: General   • PROSTATE SURGERY      laser core   • WRIST SURGERY Right        Family History   Problem Relation Age of Onset   • Breast cancer Mother          at age 67   • No Known Problems Sister    • No Known Problems Brother    • No Known Problems Brother    • No Known Problems Brother          Medications have been verified  Objective   /87   Pulse 64   Temp 98 °F (36 7 °C)   Resp 16   Ht 6' (1 829 m)   Wt 103 kg (226 lb)   SpO2 97%   BMI 30 65 kg/m²   No LMP for male patient  Physical Exam     Physical Exam  Vitals and nursing note reviewed  Constitutional:       Appearance: Normal appearance  He is well-developed  HENT:      Head: Normocephalic and atraumatic  Right Ear: Tympanic membrane, ear canal and external ear normal       Left Ear: Tympanic membrane, ear canal and external ear normal       Nose: Nose normal       Mouth/Throat:      Mouth: Mucous membranes are moist       Pharynx: Uvula midline  Pharyngeal swelling and posterior oropharyngeal erythema present  Tonsils: No tonsillar exudate or tonsillar abscesses  Eyes:      General: Lids are normal       Conjunctiva/sclera: Conjunctivae normal       Pupils: Pupils are equal, round, and reactive to light  Cardiovascular:      Rate and Rhythm: Normal rate and regular rhythm  Heart sounds: Normal heart sounds  No murmur heard  No friction rub  No gallop  Pulmonary:      Effort: Pulmonary effort is normal       Breath sounds: Normal breath sounds  No stridor  No wheezing or rales  Musculoskeletal:         General: Normal range of motion  Cervical back: Neck supple  Lymphadenopathy:      Cervical: Cervical adenopathy present  Skin:     General: Skin is warm and dry  Capillary Refill: Capillary refill takes less than 2 seconds  Neurological:      Mental Status: He is alert           POC rapid COVID-19 negative

## 2022-11-10 NOTE — ASSESSMENT & PLAN NOTE
11/10/2022    Chief Complaint   Patient presents with   • Annual Exam     As above      HPI:    Here for annual physical   Feels well.  Pt continues to have GERD sx.      Fam Hx: father  90's; mother  50's MI; brother and sister have had coronary stents  Soc Hx: neg  Past Hx: hyperlipidemia, Covid-19, GERD, elevated PSA; lap choley, appy, EGD, colonoscopy       Current Medications:   Current Outpatient Medications   Medication Sig Dispense Refill   • minocycline (MINOCIN) 100 MG capsule TAKE 1 CAPSULE BY MOUTH EVERY MORNING AND 1 CAPSULE BEFORE BEDTIME FOR 7 DAYS     • atorvastatin (LIPITOR) 10 MG tablet TAKE 1 TABLET BY MOUTH DAILY 90 tablet 0   • tadalafil 2.5 MG tablet TAKE 1 TABLET BY MOUTH DAILY 30 tablet 3   • fluticasone (FLONASE) 50 MCG/ACT nasal spray SHAKE LIQUID AND USE 2 SPRAYS IN EACH NOSTRIL DAILY 48 g 3   • omeprazole (PRILOSEC) 20 MG capsule Take 20 mg by mouth as needed.       No current facility-administered medications for this visit.       Relevant Past Medical History:  Past Medical History:   Diagnosis Date   • Colon polyp 2019   • GERD (gastroesophageal reflux disease)    • Internal hemorrhoid        Past Surgical History:   Procedure Laterality Date   • Appendectomy     • Cholecystectomy         ALLERGIES:  No Known Allergies    Social History     Socioeconomic History   • Marital status:      Spouse name: Not on file   • Number of children: Not on file   • Years of education: Not on file   • Highest education level: Not on file   Occupational History   • Not on file   Tobacco Use   • Smoking status: Never Smoker   • Smokeless tobacco: Never Used   Vaping Use   • Vaping Use: Not on file   Substance and Sexual Activity   • Alcohol use: Never   • Drug use: Never   • Sexual activity: Not on file   Other Topics Concern   • Not on file   Social History Narrative   • Not on file     Social Determinants of Health     Financial Resource Strain: Not on file   Food Insecurity: Not on  Patient had and continues to note intermittent lower extremity edema  Echocardiogram 2/2021 with EF 60% and grade 1 diastolic dysfunction  No shortness of breath/dyspnea on exertion  Patient was given furosemide 20mg daily for PRN use for leg swelling by CRNP  He does note leg heaviness and I suspect he has a component of venous insufficiency  Can consider VI testing moving forward  I did recommend he elevate legs when sitting and also that he try to wear compression socks (15-20 mmHg)  file   Transportation Needs: Not on file   Physical Activity: Not on file   Stress: Not on file   Social Connections: Not on file   Intimate Partner Violence: Not on file       Health Maintenance Due   Topic Date Due   • Hepatitis B Vaccine (For Physician/APC Discussion) (1 of 3 - 3-dose series) Never done   • COVID-19 Vaccine (5 - Booster for Pfizer series) 07/06/2022        Family History   Problem Relation Age of Onset   • Heart disease Mother        Review of Systems  Pain anterior aspect right elbow since doing curls       Examination:   Blood pressure 123/81, pulse 100, temperature 96.6 °F (35.9 °C), temperature source Tympanic, height 6' (1.829 m), weight 99.8 kg (220 lb 0.3 oz), SpO2 96 %.  Weight    11/10/22 0924   Weight: 99.8 kg (220 lb 0.3 oz)       Physical Exam  Constitutional:       General: He is not in acute distress.     Appearance: He is normal weight.   HENT:      Head: Normocephalic.      Right Ear: Tympanic membrane and ear canal normal.      Left Ear: Tympanic membrane and ear canal normal.      Nose: Nose normal.      Mouth/Throat:      Mouth: Mucous membranes are moist.      Pharynx: Oropharynx is clear.   Eyes:      Extraocular Movements: Extraocular movements intact.      Pupils: Pupils are equal, round, and reactive to light.      Comments: Fundi normal   Neck:      Vascular: No carotid bruit.      Comments: Thyroid normal  No JVD  Cardiovascular:      Rate and Rhythm: Normal rate and regular rhythm.      Pulses: Normal pulses.      Heart sounds: Normal heart sounds. No murmur heard.    No gallop.   Pulmonary:      Breath sounds: Normal breath sounds.   Abdominal:      General: Bowel sounds are normal.      Palpations: Abdomen is soft. There is no mass.      Tenderness: There is no abdominal tenderness.      Hernia: No hernia is present.      Comments: L&S OK   Genitourinary:     Penis: Normal.       Testes: Normal.      Prostate: Normal.      Rectum: Normal. Guaiac result negative.    Musculoskeletal:         General: No tenderness. Normal range of motion.      Cervical back: Normal range of motion.      Right lower leg: No edema.      Left lower leg: No edema.      Comments: Right elbow: normal ROM; non tender; good hand grasp; no deformities   Lymphadenopathy:      Cervical: No cervical adenopathy.   Neurological:      General: No focal deficit present.      Mental Status: He is alert and oriented to person, place, and time.      Cranial Nerves: No cranial nerve deficit.      Deep Tendon Reflexes: Reflexes normal.           Assessment/Plan:  Physical exam  GERD  Hyperlipidemia    Routine lab  Pt wants CRP checked because two sibs have had coronary stents    Pt is current with colonoscopy    Flu vax  Shingrix    No follow-ups on file.    Patient Care Team:  Eduardo Crain III, MD as PCP - General (Internal Medicine)     Eduardo Crain M.D.

## 2022-12-01 ENCOUNTER — OFFICE VISIT (OUTPATIENT)
Dept: CARDIOLOGY CLINIC | Facility: CLINIC | Age: 72
End: 2022-12-01

## 2022-12-01 VITALS
HEART RATE: 62 BPM | HEIGHT: 72 IN | BODY MASS INDEX: 30.2 KG/M2 | WEIGHT: 223 LBS | DIASTOLIC BLOOD PRESSURE: 90 MMHG | OXYGEN SATURATION: 96 % | SYSTOLIC BLOOD PRESSURE: 146 MMHG

## 2022-12-01 DIAGNOSIS — I10 PRIMARY HYPERTENSION: ICD-10-CM

## 2022-12-01 DIAGNOSIS — R60.0 LOCALIZED EDEMA: ICD-10-CM

## 2022-12-01 DIAGNOSIS — R03.0 ELEVATED BLOOD-PRESSURE READING, WITHOUT DIAGNOSIS OF HYPERTENSION: ICD-10-CM

## 2022-12-01 DIAGNOSIS — E78.2 MIXED HYPERLIPIDEMIA: ICD-10-CM

## 2022-12-01 DIAGNOSIS — I25.10 CORONARY ARTERY DISEASE INVOLVING NATIVE CORONARY ARTERY OF NATIVE HEART WITHOUT ANGINA PECTORIS: Primary | ICD-10-CM

## 2022-12-01 DIAGNOSIS — Z95.5 S/P CORONARY ARTERY STENT PLACEMENT: ICD-10-CM

## 2022-12-01 DIAGNOSIS — R55 NEAR SYNCOPE: ICD-10-CM

## 2022-12-01 DIAGNOSIS — R00.1 BRADYCARDIA: ICD-10-CM

## 2022-12-01 RX ORDER — LOSARTAN POTASSIUM 25 MG/1
25 TABLET ORAL DAILY
Qty: 30 TABLET | Refills: 11 | Status: SHIPPED | OUTPATIENT
Start: 2022-12-01

## 2022-12-01 NOTE — PATIENT INSTRUCTIONS
Continue current medications without change  Add losartan 25 mg daily in am    Monitor blood pressure intermittently with addition of losartan  Blood work in 2 weeks to check kidney function and potassium  Call me next week with blood pressures and let me know how they are running

## 2022-12-01 NOTE — PROGRESS NOTES
Cardiology Office Visit    Daniel Anthony  [id number]  1950    [facility]  [address]  [address]      Dear Jose Ricketts MD,    I had the pleasure of seeing your patient at our [address] [facility] [place] office today 12/1/2022  As you know he is a pleasant 71 y o  male with a medical history as described below  Reason for office visit: Follow up coronary artery disease s/p LAD stent and hyperlipidemia  1  Coronary artery disease involving native coronary artery of native heart without angina pectoris  Assessment & Plan:  Coronary Artery Disease:   A  Abnormal stress echocardiogram 11/12/2020 with LAD wall motion abnormality  B  Cardiac catheterization 11/19/2020 showed a 99% stenosed mid LAD lesion which was stented with BLANCA  C  Residual 50% LCx(d) and 50% RtPDA disease  - - - - - - - -  Patient is doing well post stenting  He denies any recurrent chest pain  He completed 1 year of DAPT with aspirin and clopidogrel  Clopidogrel was stopped a few days prior to his hernia repair  Continue aspirin 81 mg daily lifelong  Echocardiogram 2/10/2021 showed normal LVEF of 60% without any significant valvular abnormalities  Continue metoprolol succinate 12 5 mg daily which was reduced secondary to bradycardia  Continue atorvastatin 40 mg daily for goal LDL < 70  Orders:  -     Basic metabolic panel; Future    2  S/P coronary artery stent placement  Assessment & Plan:  See discussion under CAD  3  Mixed hyperlipidemia  Assessment & Plan:  Lipid panel 4/18/2022: C 134  T 128  H 48  L 60  Continue atorvastatin 40 mg daily  Goal LDL < 70  Repeat lipid panel 4/2023        4  Elevated blood-pressure reading, without diagnosis of hypertension  Assessment & Plan:  Patient denied any history of hypertension however did note intermittent spikes in blood pressure    Blood pressure initially elevated but improved on my personal recheck at initial office visit and again today (132/80 on my check)  Patient is currently on metoprolol succinate 12 5 mg daily (dose lowered due to bradycardia)  Blood pressure remains labile at times and is borderline today in the office  Will start losartan 25 mg daily  BMP in 2 weeks to check renal function  Patient instructed to call the office with an update on blood pressures next week  Patient was asked to continue to monitor blood pressure intermittently  Orders:  -     losartan (COZAAR) 25 mg tablet; Take 1 tablet (25 mg total) by mouth daily    5  Bradycardia  Assessment & Plan:  Patient previously noted intermittent episodes of bradycardia with heart rates in the 40s at home  He also noted associated near-syncope with some of these episodes  ZIO 3/2021 showed an average heart rate of 64 bpm (other details as outlined in results)  Metoprolol succinate was decreased to 12 5 mg daily  No recurrent bradycardia noted  6  Near syncope  Assessment & Plan:  See discussion and plan under bradycardia  No recurrence  7  Localized edema  Assessment & Plan:  Patient had and continues to note intermittent lower extremity edema  Echocardiogram 2/2021 with EF 60% and grade 1 diastolic dysfunction  No shortness of breath/dyspnea on exertion  Patient was given furosemide 20mg daily for PRN use for leg swelling by CRNP  He does note leg heaviness and I suspect he has a component of venous insufficiency  Can consider VI testing moving forward  I did recommend he elevate legs when sitting and also that he try to wear compression socks (15-20 mmHg)  He is currently wearing compression stockings and notes improvement in both edema and leg heaviness  8  Primary hypertension  Assessment & Plan:  Patient denied any history of hypertension however did note intermittent spikes in blood pressure    Blood pressure initially elevated but improved on my personal recheck at initial office visit and again today (132/80 on my check)  Patient is currently on metoprolol succinate 12 5 mg daily (dose lowered due to bradycardia)  Blood pressure remains labile at times and is borderline today in the office  Will start losartan 25 mg daily  BMP in 2 weeks to check renal function  Patient instructed to call the office with an update on blood pressures next week  Patient was asked to continue to monitor blood pressure intermittently  HPI   Patient with prior history of hypertension and dyslipidemia which was intermittently treated  Patient had noted onset of chest pain with radiation to his neck and the right side of his jaw primarily with exertion  Stress echocardiogram was done 11/12/2020 which is notable for reproduction of symptoms of anginal chest pain  Frequent PVCs were noted and the patient was noted to have hypokinesis in the LAD territory  Patient underwent elective cardiac catheterization 11/19/2020  Patient was noted to have a 99% mid LAD stenosis just after the 1st diagonal   This was treated with PCI /BLANCA  Residual nonobstructive disease was noted in the distal left circumflex and PDA  He was discharged home on aspirin, clopidogrel, metoprolol succinate and atorvastatin  Patient was seen at [address] [place] [address] 11/24/2020 at which time he was doing well  He declined cardiac rehabilitation as he was very active  He wanted to transfer to cardiology closer to home  1/13/2021: Patient presents to the office today to establish care  In general he has been feeling well  He has had intermittent spikes in his blood pressure since his discharge  He also reports episodes of lightheadedness which seem to occur when blood pressure is low  He denies any recurrent chest pain  He denies any palpitations  He remains active at home without limitation  He has been taking his medications as prescribed   Both he and his wife transitioned to a more plant based diet  3/23/2021:  Patient returns to the office today for follow-up  He does continue to have intermittent lightheadedness  He denies any overt dizziness  He does note that his heart rate at times has dipped into the high 40s  He tells me this can sometimes last for several hours  His last episode of bradycardia was approximately 1 week ago  He tells me he did feel as if he was going to pass out  He denies any palpitations  He denies any chest pain  Blood pressure has been okay  He did bring a list of his blood pressures from home  Blood pressures appear very well controlled with intermittent slow heart rates documented in the low 50s  * Patient was seen by Sabrina Reno 11/3/2021 for preoperative clearance for hernia repair  His clopidogrel was stopped for surgery and not resumed as he had completed 1 year of DAPT  5/4/2022: Patient returns to the office today follow-up  He is doing very well since I last saw him  He denies any chest pain  He denies any shortness of breath  No palpitations  He and his wife are walking 2-3 miles a day  No bradycardia  Lipid panel reviewed from 4/18/2022 was perfect with an LDL of 60  He does note some leg heaviness with ambulation  Gautam Marvin is still in a bit of shock over his diagnosis of coronary artery disease  We spent a good deal of time discussing his concerns (totally valid concerns) and that we now know what we are dealing with and will continue to do all we can to halt progression of residual coronary disease and to keep his stent open  I assured him that we are doing all we can to keep him from having recurrent problems: his cholesterol and blood pressure are perfect, he is walking daily and they are trying to make better dietary choices and we have him a good regimen of cardiac medications  * Patient was seen by Sabrina Reno 7/13/2022 12/1/2022: Patient returns to the office today for follow up  He is doing well  He remains active   They continues to walk 2-3 miles a day  Heart rates have been in the low 60's  Blood pressure is a bit labile at times  No edema  He is using compression socks  Blood pressures have been in the 138/80 range at home  Leg heaviness improved with compression stockings  Patient Active Problem List   Diagnosis   • Coronary artery disease involving native coronary artery   • S/P coronary artery stent placement   • Mixed hyperlipidemia   • Primary hypertension   • Localized edema   • History of PTCA   • Bradycardia   • Near syncope   • Preop cardiovascular exam     Past Medical History:   Diagnosis Date   • Abnormal stress test    • Coronary artery disease     BLANCA to LAD(m) 2020  • Depression    • Dyslipidemia 2020   • History of BPH    • Inguinal hernia     Left      Social History     Socioeconomic History   • Marital status: /Civil Union     Spouse name: Not on file   • Number of children: Not on file   • Years of education: Not on file   • Highest education level: Not on file   Occupational History   • Occupation: Retired   Tobacco Use   • Smoking status: Never   • Smokeless tobacco: Never   Vaping Use   • Vaping Use: Never used   Substance and Sexual Activity   • Alcohol use:  Yes     Alcohol/week: 1 0 standard drink     Types: 1 Glasses of wine per week     Comment: socially    • Drug use: Never   • Sexual activity: Yes     Partners: Female   Other Topics Concern   • Not on file   Social History Narrative   • Not on file     Social Determinants of Health     Financial Resource Strain: Not on file   Food Insecurity: Not on file   Transportation Needs: Not on file   Physical Activity: Not on file   Stress: Not on file   Social Connections: Not on file   Intimate Partner Violence: Not on file   Housing Stability: Not on file      Family History   Problem Relation Age of Onset   • Breast cancer Mother          at age 67   • No Known Problems Sister    • No Known Problems Brother    • No Known Problems Brother    • No Known Problems Brother      Past Surgical History:   Procedure Laterality Date   • CARDIAC CATHETERIZATION  11/19/2020    99% LAD(m) after D1  Residual 50% LCx(d) and 50% RtPDA  • CORONARY ANGIOPLASTY WITH STENT PLACEMENT  11/19/2020    BLANCA to LAD(m)  • HERNIA REPAIR     • LUMBAR DISC SURGERY     • [place] LAP,INGUINAL HERNIA REPR,INITIAL Left 11/12/2021    Procedure: ROBOTIC ASSISTED LAPAROSCOPIC INGUINAL HERNIA REPAIR WITH MESH;  Surgeon: Ginger Bradford DO;  Location: [facility];  Service: General   • PROSTATE SURGERY      laser core   • WRIST SURGERY Right          Current Outpatient Medications:   •  aspirin (ECOTRIN LOW STRENGTH) 81 mg EC tablet, Take 81 mg by mouth daily Pt was to continue taking d/t heart stents, Disp: , Rfl:   •  atorvastatin (LIPITOR) 40 mg tablet, Take 1 tablet (40 mg total) by mouth daily with dinner, Disp: 90 tablet, Rfl: 3  •  losartan (COZAAR) 25 mg tablet, Take 1 tablet (25 mg total) by mouth daily, Disp: 30 tablet, Rfl: 11  •  metoprolol succinate (TOPROL-XL) 25 mg 24 hr tablet, Take 0 5 tablets (12 5 mg total) by mouth daily at bedtime, Disp: 45 tablet, Rfl: 3  •  nitroglycerin (NITROSTAT) 0 4 mg SL tablet, Place 1 tablet (0 4 mg total) under the tongue every 5 (five) minutes as needed for chest pain, Disp: 15 tablet, Rfl: 0  •  Omega-3 Fatty Acids (fish oil) 1,000 mg, Fish Oil, Disp: , Rfl:   •  Misc Natural Products (PROSTATE SUPPORT PO), Take 3 tablets by mouth daily (Patient not taking: Reported on 12/1/2022), Disp: , Rfl:     Allergies   Allergen Reactions   • Amoxicillin Rash       Cardiac Test Results:     ECG 7/13/2022:     Lipid panel 4/18/2022: C 134  T 128  H 48  L 60  Lipid panel 3/16/2021: C 105  T 78  H 58  L 31  Echocardiogram 2/10/2021:   EF 60%  Mild diastolic dysfunction  No significant valve abnormalities  EKG 01/13/2021: Normal sinus rhythm  Nonspecific ST abnormality  Lipid panel 11/20/2020: C 176  T 128   H 51  L 99  Review of Systems:    Review of Systems   Constitutional: Negative for activity change, appetite change and fatigue  HENT: Negative for congestion, hearing loss, tinnitus and trouble swallowing  Eyes: Negative for visual disturbance  Respiratory: Negative for cough, chest tightness, shortness of breath and wheezing  Cardiovascular: Negative for chest pain, palpitations and leg swelling  Gastrointestinal: Negative for abdominal distention, abdominal pain, nausea and vomiting  Genitourinary: Negative for difficulty urinating  Musculoskeletal: Negative for arthralgias  Leg heaviness   Skin: Negative for rash  Neurological: Negative for dizziness, syncope and light-headedness  Hematological: Does not bruise/bleed easily  Psychiatric/Behavioral: Negative for confusion  The patient is not nervous/anxious  All other systems reviewed and are negative  Vitals:    12/01/22 1054 12/01/22 1101 12/01/22 1136   BP: 148/82 138/95 146/90   Pulse: 62     SpO2: 96%     Weight: 101 kg (223 lb)     Height: 6' (1 829 m)       Vitals:    12/01/22 1054   Weight: 101 kg (223 lb)     Height: 6' (182 9 cm)     Physical Exam  Vitals reviewed  Constitutional:       Appearance: He is well-developed  HENT:      Head: Normocephalic and atraumatic  Eyes:      Conjunctiva/sclera: Conjunctivae normal       Pupils: Pupils are equal, round, and reactive to light  Neck:      Vascular: No JVD  Cardiovascular:      Rate and Rhythm: Normal rate and regular rhythm  Heart sounds: Normal heart sounds  No murmur heard  No friction rub  No gallop  Pulmonary:      Effort: Pulmonary effort is normal       Breath sounds: Normal breath sounds  Abdominal:      General: Bowel sounds are normal       Palpations: Abdomen is soft  Musculoskeletal:      Cervical back: Normal range of motion  Right lower leg: Edema present  Left lower leg: Edema (L>R) present     Skin:     General: Skin is warm and dry  Neurological:      Mental Status: He is alert and oriented to person, place, and time     Psychiatric:         Behavior: Behavior normal

## 2022-12-02 PROBLEM — I10 PRIMARY HYPERTENSION: Status: ACTIVE | Noted: 2021-01-13

## 2022-12-02 NOTE — ASSESSMENT & PLAN NOTE
Patient denied any history of hypertension however did note intermittent spikes in blood pressure  Blood pressure initially elevated but improved on my personal recheck at initial office visit and again today (132/80 on my check)  Patient is currently on metoprolol succinate 12 5 mg daily (dose lowered due to bradycardia)  Blood pressure remains labile at times and is borderline today in the office  Will start losartan 25 mg daily  BMP in 2 weeks to check renal function  Patient instructed to call the office with an update on blood pressures next week  Patient was asked to continue to monitor blood pressure intermittently

## 2022-12-02 NOTE — ASSESSMENT & PLAN NOTE
Coronary Artery Disease:   A  Abnormal stress echocardiogram 11/12/2020 with LAD wall motion abnormality  B  Cardiac catheterization 11/19/2020 showed a 99% stenosed mid LAD lesion which was stented with BLANCA  C  Residual 50% LCx(d) and 50% RtPDA disease  - - - - - - - -  Patient is doing well post stenting  He denies any recurrent chest pain  He completed 1 year of DAPT with aspirin and clopidogrel  Clopidogrel was stopped a few days prior to his hernia repair  Continue aspirin 81 mg daily lifelong  Echocardiogram 2/10/2021 showed normal LVEF of 60% without any significant valvular abnormalities  Continue metoprolol succinate 12 5 mg daily which was reduced secondary to bradycardia  Continue atorvastatin 40 mg daily for goal LDL < 70

## 2022-12-02 NOTE — ASSESSMENT & PLAN NOTE
Patient had and continues to note intermittent lower extremity edema  Echocardiogram 2/2021 with EF 60% and grade 1 diastolic dysfunction  No shortness of breath/dyspnea on exertion  Patient was given furosemide 20mg daily for PRN use for leg swelling by CRNP  He does note leg heaviness and I suspect he has a component of venous insufficiency  Can consider VI testing moving forward  I did recommend he elevate legs when sitting and also that he try to wear compression socks (15-20 mmHg)  He is currently wearing compression stockings and notes improvement in both edema and leg heaviness

## 2022-12-15 ENCOUNTER — HOSPITAL ENCOUNTER (OUTPATIENT)
Dept: NON INVASIVE DIAGNOSTICS | Facility: HOSPITAL | Age: 72
Discharge: HOME | End: 2022-12-15

## 2022-12-15 DIAGNOSIS — M54.31 SCIATICA, RIGHT SIDE: ICD-10-CM

## 2022-12-19 ENCOUNTER — APPOINTMENT (OUTPATIENT)
Dept: LAB | Facility: HOSPITAL | Age: 72
End: 2022-12-19

## 2022-12-19 DIAGNOSIS — I25.10 CORONARY ARTERY DISEASE INVOLVING NATIVE CORONARY ARTERY OF NATIVE HEART WITHOUT ANGINA PECTORIS: ICD-10-CM

## 2022-12-19 LAB
ANION GAP SERPL CALCULATED.3IONS-SCNC: 9 MMOL/L (ref 4–13)
BUN SERPL-MCNC: 19 MG/DL (ref 5–25)
CALCIUM SERPL-MCNC: 9.1 MG/DL (ref 8.3–10.1)
CHLORIDE SERPL-SCNC: 104 MMOL/L (ref 96–108)
CHOLEST SERPL-MCNC: 141 MG/DL
CO2 SERPL-SCNC: 28 MMOL/L (ref 21–32)
CREAT SERPL-MCNC: 1.05 MG/DL (ref 0.6–1.3)
GFR SERPL CREATININE-BSD FRML MDRD: 71 ML/MIN/1.73SQ M
GLUCOSE P FAST SERPL-MCNC: 95 MG/DL (ref 65–99)
HDLC SERPL-MCNC: 68 MG/DL
LDLC SERPL CALC-MCNC: 39 MG/DL (ref 0–100)
NONHDLC SERPL-MCNC: 73 MG/DL
POTASSIUM SERPL-SCNC: 4.2 MMOL/L (ref 3.5–5.3)
SODIUM SERPL-SCNC: 141 MMOL/L (ref 135–147)
TRIGL SERPL-MCNC: 171 MG/DL

## 2022-12-25 DIAGNOSIS — R03.0 ELEVATED BLOOD-PRESSURE READING, WITHOUT DIAGNOSIS OF HYPERTENSION: ICD-10-CM

## 2022-12-27 RX ORDER — LOSARTAN POTASSIUM 25 MG/1
25 TABLET ORAL DAILY
Qty: 90 TABLET | Refills: 4 | Status: SHIPPED | OUTPATIENT
Start: 2022-12-27

## 2023-01-25 DIAGNOSIS — Z95.5 S/P CORONARY ARTERY STENT PLACEMENT: ICD-10-CM

## 2023-01-25 RX ORDER — METOPROLOL SUCCINATE 25 MG/1
12.5 TABLET, EXTENDED RELEASE ORAL
Qty: 45 TABLET | Refills: 3 | Status: SHIPPED | OUTPATIENT
Start: 2023-01-25

## 2023-04-28 DIAGNOSIS — Z95.5 S/P CORONARY ARTERY STENT PLACEMENT: ICD-10-CM

## 2023-04-28 RX ORDER — ATORVASTATIN CALCIUM 40 MG/1
TABLET, FILM COATED ORAL
Qty: 90 TABLET | Refills: 3 | Status: SHIPPED | OUTPATIENT
Start: 2023-04-28

## 2023-06-01 DIAGNOSIS — Z95.5 S/P CORONARY ARTERY STENT PLACEMENT: ICD-10-CM

## 2023-06-01 RX ORDER — ATORVASTATIN CALCIUM 40 MG/1
40 TABLET, FILM COATED ORAL
Qty: 90 TABLET | Refills: 3 | Status: SHIPPED | OUTPATIENT
Start: 2023-06-01

## 2023-06-01 NOTE — TELEPHONE ENCOUNTER
Patient was scheduled today 6/1/23 and was going to ask for a refill on his atorvastatin (LIPITOR) 40 mg tablet  TAKE 1 TABLET BY MOUTH DAILY WITH DINNER - but appointment had to be reschedule to a later date due to a conflict in providers scheduled    Ordering provider: Mazin Carson, [address] [place] [address] Avenue: 12/1/22  Next OV: 8/11/23  Supply: 90    Patient can be reached at [phone]

## 2023-09-07 ENCOUNTER — OFFICE VISIT (OUTPATIENT)
Dept: CARDIOLOGY CLINIC | Facility: CLINIC | Age: 73
End: 2023-09-07
Payer: MEDICARE

## 2023-09-07 VITALS
HEART RATE: 63 BPM | HEIGHT: 72 IN | WEIGHT: 221.8 LBS | BODY MASS INDEX: 30.04 KG/M2 | TEMPERATURE: 97.8 F | DIASTOLIC BLOOD PRESSURE: 86 MMHG | SYSTOLIC BLOOD PRESSURE: 148 MMHG | OXYGEN SATURATION: 96 %

## 2023-09-07 DIAGNOSIS — N18.31 STAGE 3A CHRONIC KIDNEY DISEASE (HCC): ICD-10-CM

## 2023-09-07 DIAGNOSIS — E78.2 MIXED HYPERLIPIDEMIA: ICD-10-CM

## 2023-09-07 DIAGNOSIS — I25.119 ATHEROSCLEROSIS OF NATIVE CORONARY ARTERY OF NATIVE HEART WITH ANGINA PECTORIS (HCC): Primary | ICD-10-CM

## 2023-09-07 DIAGNOSIS — R00.1 BRADYCARDIA: ICD-10-CM

## 2023-09-07 DIAGNOSIS — R55 NEAR SYNCOPE: ICD-10-CM

## 2023-09-07 DIAGNOSIS — I25.10 CORONARY ARTERY DISEASE INVOLVING NATIVE CORONARY ARTERY OF NATIVE HEART WITHOUT ANGINA PECTORIS: ICD-10-CM

## 2023-09-07 DIAGNOSIS — Z95.5 S/P CORONARY ARTERY STENT PLACEMENT: ICD-10-CM

## 2023-09-07 PROCEDURE — 99204 OFFICE O/P NEW MOD 45 MIN: CPT | Performed by: INTERNAL MEDICINE

## 2023-09-07 PROCEDURE — 99214 OFFICE O/P EST MOD 30 MIN: CPT | Performed by: INTERNAL MEDICINE

## 2023-09-07 RX ORDER — MELOXICAM 15 MG/1
15 TABLET ORAL DAILY
COMMUNITY
Start: 2023-08-27

## 2023-09-07 NOTE — PROGRESS NOTES
[facility]  [address] [place] [address] [address] [address]  Phone#  [phone]. Fax#  [phone]  *-*-*-*-*-*-*-*-*-*-*-*-*-*-*-*-*-*-*-*-*-*-*-*-*-*-*-*-*-*-*-*-*-*-*-*-*-*-*-*-*-*-*-*-*-*-*-*-*-*-*-*-*-*  ENCOUNTER DATE: 09/07/23 3:28 PM  PATIENT NAME: Jasper Morse   1950    [id number]  AGE:73 y.o. SEX: male  [address] [place] [address], MD     PRIMARY CARE PHYSICIAN: Pb Esquivel MD    DIAGNOSES:  1. Coronary artery disease, status post PCI of mid LAD November 2020 with 50% right PDA and 50% distal LCx residual disease  2. Dyslipidemia  3. Elevated blood pressure without diagnosis of hypertension  4. Asymptomatic bradycardia history of symptomatic bradycardia  5. Intermittent lower extremity edema  6. Depression  7. BPH s/p TURP 2022  8. Left inguinal hernia  9. Degenerative joint disease with osteoarthritis of spine and knees. ECHOCARDIOGRAM 2/10/2021:   EF 60%. Mild diastolic dysfunction. No significant valve abnormalities. EXTENDED HOLTER MONITOR June 2021  Patient had a min HR of 44 bpm, max HR of 171 bpm, and avg HR of 64  bpm. Predominant underlying rhythm was Sinus Rhythm. 1 run of  Ventricular Tachycardia occurred lasting 4 beats with a max rate of 171  bpm (avg 137 bpm). 15 Supraventricular Tachycardia runs occurred, the  run with the fastest interval lasting 5 beats with a max rate of 148 bpm,  the longest lasting 15 beats with an avg rate of 122 bpm. Isolated SVEs  were rare (<1.0%), SVE Couplets were rare (<1.0%), and SVE Triplets were  rare (<1.0%). Isolated VEs were rare (<1.0%), and no VE Couplets or VE  Triplets were present. CARDIAC CATHETERIZATION 11/19/2020:  --  The coronary circulation is right dominant. --  Left main: Normal.  --  Mid LAD: There was a 99 % stenosis just after D1. There was LINDA grade 1 flow through the vessel (slow flow without perfusion).   Status post balloon angioplasty and PCI with residual 0% stenosis and LINDA-3 flow  --  Circumflex: Angiography showed minor luminal irregularities. --  Distal circumflex: The vessel was very small sized. There was a diffuse 50 % stenosis, in small diameter distal AV groove circumflex. --  RCA: Angiography showed minor luminal irregularities. --  Right PDA: The vessel was small sized. There was a 50 % stenosis. CURRENT ECG   No results found for this visit on 09/07/23. CARDIOLOGY ASSESSMENT & PLAN     1. Atherosclerosis of native coronary artery of native heart with angina pectoris ([address])  NM myocardial perfusion spect (rx stress and/or rest)    Echo complete w/ contrast if indicated      2. Coronary artery disease involving native coronary artery of native heart without angina pectoris        3. Near syncope        4. Bradycardia        5. S/P coronary artery stent placement        6. Mixed hyperlipidemia        7. Stage 3a chronic kidney disease (HCC)          Atherosclerosis of native coronary artery of native heart with angina pectoris [facility])  Mr. Reyes Garcia is overall stable from cardiac perspective though he has been experiencing some transient chest discomfort with exertion. This has not affected his activities of daily living but he does have concerns and he is avoiding overexertion. His blood pressure is reasonably well controlled. On physical examination there is no evidence of pulmonary or peripheral vascular congestion. His lipids are well controlled. He has not had any ischemic testing since his PCI. He his last echocardiogram was in 2021 and he had mild diastolic dysfunction. Considering his symptoms and history of residual disease and as he is concerned about residual CAD it would be reasonable to do a stress test.    -- Am requesting an exercise nuclear stress test and an echocardiogram to assess his cardiac structure and function. -- I am advising him to continue normal activities including walking and yoga.   He is advised to keep a diary of symptoms and to take a sublingual nitroglycerin if he gets chest discomfort. He should avoid driving or walking after taking the nitroglycerin as it may drop his blood pressure. -- If he develops symptoms at rest or with normal activities at home then he is advised to call us or go to the emergency room. -- I do want to see him in 3 months time after his stress test and echocardiogram.  -- Dietary and medical compliance are reinforced. -- He is advised  to report any concerning symptoms such as chest pain, shortness of breath, decline in exercise tolerance or presyncope/syncope. -- I am providing him some tips about nonpharmacologic measures to improve blood pressures. INTERVAL HISTORY & HISTORY OF PRESENT ILLNESS     Davidson Yates is here for follow-up regarding his cardiac comorbidities which include: Coronary artery disease with history of PCI in 2020, symptomatic bradycardia, borderline intermittent blood pressure elevated and other comorbidities. He is here for follow-up visit accompanied with his wife. He was last seen by Dr. Dennise Mosher in December 2022. He mentions that since last visit he has had no new diagnosis or hospitalizations or significant illnesses. From a symptom perspective he reports that he had been experiencing intermittent transient dizziness but this symptom has improved and resolved since he changed the timing of his losartan to evening. He has had no recent chest pain or unusual shortness of breath or palpitations or passing out or near passing out episodes. He also mentions that previously noted lower extremity edema is now resolved. He walks regularly and does yoga 3 times a week. He does experience mild transient chest discomfort at times with activity. Symptoms are very mild and do not affect his activities of daily living. He has been monitoring his vital signs at home and is showing me records from his phone.       Functional capacity status: Good   (Excellent- >10 METs; Good: (7-10 METs); Moderate (4-7 METs); Poor (<= 4 METs)    Any chronic stressors: None   (feeling of poor health, financial problems, and social isolation etc). Tobacco or alcohol dependence: He denies any history of smoking. He drinks alcohol occasionally. Current cardiac medications: Losartan 25 mg daily atorvastatin 40 mg daily metoprolol succinate 12.5 mg daily aspirin 81 mg daily sublingual nitroglycerin as needed omega-3 fatty acids    Blood work from 12/19/2022:  Sodium 141 potassium 4.2 chloride 104 bicarb 28 BUN 19 creatinine 1.05 GFR 71  Total cholesterol 141 triglyceride 171 HDL 68 calculated LDL 39    REVIEW OF SYSTEMS   Positive for:  As noted above HPI  Negative for: All remaining as reviewed below and in HPI. SYSTEM SYMPTOMS REVIEWED:  General--weight change, fever, night sweats  Respiratory--cough, wheezing, shortness of breath, sputum production  Cardiovascular--chest pain, syncope, dyspnea on exertion, edema, decline in exercise tolerance, claudication   Gastrointestinal--persistent vomiting, diarrhea, abdominal distention, blood in stool   Muscular or skeletal--joint pain or swelling   Neurologic--headaches, syncope, abnormal movement  Hematologic--history of easy bruising and bleeding   Endocrine--thyroid enlargement, heat or cold intolerance, polyuria   Psychiatric--anxiety, depression     *-*-*-*-*-*-*-*-*-*-*-*-*-*-*-*-*-*-*-*-*-*-*-*-*-*-*-*-*-*-*-*-*-*-*-*-*-*-*-*-*-*-*-*-*-*-*-*-*-*-*-*-*-*-  VITAL SIGNS     CURRENT VITAL SIGNS:   Vitals:    09/07/23 1447   BP: 148/86   BP Location: Left arm   Patient Position: Sitting   Cuff Size: Standard   Pulse: 63   Temp: 97.8 °F (36.6 °C)   SpO2: 96%   Weight: 101 kg (221 lb 12.8 oz)   Height: 6' (1.829 m)       BMI: Body mass index is 30.08 kg/m².     WEIGHTS:   Wt Readings from Last 25 Encounters:   09/07/23 101 kg (221 lb 12.8 oz)   12/01/22 101 kg (223 lb)   10/23/22 103 kg (226 lb)   07/13/22 103 kg (226 lb 3.2 oz)   05/04/22 102 kg (225 lb)   11/12/21 101 kg (222 lb)   11/03/21 101 kg (222 lb 6.4 oz)   07/13/21 97.4 kg (214 lb 12.8 oz)   04/01/21 95.7 kg (211 lb)   03/23/21 93.5 kg (206 lb 3.2 oz)   01/13/21 92.5 kg (204 lb)   11/24/20 92.6 kg (204 lb 3.2 oz)   11/19/20 92.7 kg (204 lb 5.9 oz)        *-*-*-*-*-*-*-*-*-*-*-*-*-*-*-*-*-*-*-*-*-*-*-*-*-*-*-*-*-*-*-*-*-*-*-*-*-*-*-*-*-*-*-*-*-*-*-*-*-*-*-*-*-*-  PHYSICAL EXAM     General Appearance:    Alert, cooperative, no distress, appears stated age, increased BMO   Head, Eyes, ENT:    No obvious abnormality, moist mucous mebranes. Neck:   Supple, no carotid bruit or JVD   Back:     Symmetric, no curvature. Lungs:     Respirations unlabored. Clear to auscultation bilaterally,    Chest wall:    No tenderness or deformity   Heart:    Regular rate and rhythm, S1 and S2 normal, no murmur, rub  or gallop. Abdomen:     Soft, non-tender, No obvious masses, or organomegaly   Extremities:   Extremities warm, no cyanosis or edema    Skin:   Mild venostatic changes in lower extremities. Normal skin color, texture, and turgor. No rashes or lesions     *-*-*-*-*-*-*-*-*-*-*-*-*-*-*-*-*-*-*-*-*-*-*-*-*-*-*-*-*-*-*-*-*-*-*-*-*-*-*-*-*-*-*-*-*-*-*-*-*-*-*-*-*-*-  CURRENT MEDICATIONS LIST     Current Outpatient Medications:   •  aspirin (ECOTRIN LOW STRENGTH) 81 mg EC tablet, Take 81 mg by mouth daily Pt was to continue taking d/t heart stents, Disp: , Rfl:   •  atorvastatin (LIPITOR) 40 mg tablet, Take 1 tablet (40 mg total) by mouth daily with dinner, Disp: 90 tablet, Rfl: 3  •  losartan (COZAAR) 25 mg tablet, TAKE 1 TABLET (25 MG TOTAL) BY MOUTH DAILY. , Disp: 90 tablet, Rfl: 4  •  meloxicam (MOBIC) 15 mg tablet, Take 15 mg by mouth daily As needed, Disp: , Rfl:   •  metoprolol succinate (TOPROL-XL) 25 mg 24 hr tablet, Take 0.5 tablets (12.5 mg total) by mouth daily at bedtime, Disp: 45 tablet, Rfl: 3  •  nitroglycerin (NITROSTAT) 0.4 mg SL tablet, Place 1 tablet (0.4 mg total) under the tongue every 5 (five) minutes as needed for chest pain, Disp: 15 tablet, Rfl: 0  •  Omega-3 Fatty Acids (fish oil) 1,000 mg, Fish Oil, Disp: , Rfl:        ALLERGIES     Allergies   Allergen Reactions   • Amoxicillin Rash       *-*-*-*-*-*-*-*-*-*-*-*-*-*-*-*-*-*-*-*-*-*-*-*-*-*-*-*-*-*-*-*-*-*-*-*-*-*-*-*-*-*-*-*-*-*-*-*-*-*-*-*-*-*-  LABORATORY DATA   No results found for: "NA"  Potassium   Date Value Ref Range Status   12/19/2022 4.2 3.5 - 5.3 mmol/L Final     Comment:     Slightly Hemolyzed; Results May be Affected   11/04/2021 4.2 3.5 - 5.3 mmol/L Final     Comment:     Slightly Hemolyzed;  Results May be Affected   11/20/2020 4.0 3.5 - 5.3 mmol/L Final     Chloride   Date Value Ref Range Status   12/19/2022 104 96 - 108 mmol/L Final   11/04/2021 107 100 - 108 mmol/L Final   11/20/2020 107 100 - 108 mmol/L Final     CO2   Date Value Ref Range Status   12/19/2022 28 21 - 32 mmol/L Final   11/04/2021 29 21 - 32 mmol/L Final   11/20/2020 26 21 - 32 mmol/L Final     BUN   Date Value Ref Range Status   12/19/2022 19 5 - 25 mg/dL Final   11/04/2021 22 5 - 25 mg/dL Final   11/20/2020 17 5 - 25 mg/dL Final     Creatinine   Date Value Ref Range Status   12/19/2022 1.05 0.60 - 1.30 mg/dL Final     Comment:     Standardized to IDMS reference method   11/04/2021 1.07 0.60 - 1.30 mg/dL Final     Comment:     Standardized to IDMS reference method   11/20/2020 1.04 0.60 - 1.30 mg/dL Final     Comment:     Standardized to IDMS reference method     eGFR   Date Value Ref Range Status   12/19/2022 71 ml/min/1.73sq m Final   11/04/2021 70 ml/min/1.73sq m Final   11/20/2020 73 ml/min/1.73sq m Final     Calcium   Date Value Ref Range Status   12/19/2022 9.1 8.3 - 10.1 mg/dL Final   11/04/2021 9.0 8.3 - 10.1 mg/dL Final   11/20/2020 9.0 8.3 - 10.1 mg/dL Final     AST   Date Value Ref Range Status   11/16/2020 16 5 - 45 U/L Final     Comment:     Specimen collection should occur prior to Sulfasalazine administration due to the potential for falsely depressed results. ALT   Date Value Ref Range Status   2020 23 12 - 78 U/L Final     Comment:     Specimen collection should occur prior to Sulfasalazine administration due to the potential for falsely depressed results. Alkaline Phosphatase   Date Value Ref Range Status   2020 59 46 - 116 U/L Final     WBC   Date Value Ref Range Status   2021 4.63 4.31 - 10.16 Thousand/uL Final   2020 6.59 4.31 - 10.16 Thousand/uL Final   2020 4.66 4.31 - 10.16 Thousand/uL Final     Hemoglobin   Date Value Ref Range Status   2021 15.4 12.0 - 17.0 g/dL Final   2020 13.8 12.0 - 17.0 g/dL Final   2020 15.0 12.0 - 17.0 g/dL Final     Platelets   Date Value Ref Range Status   2021 207 149 - 390 Thousands/uL Final   2020 199 149 - 390 Thousands/uL Final   2020 226 149 - 390 Thousands/uL Final     No results found for: "PT", "PTT", "INR"  No results found for: "CKMB", "DIGOXIN"  No results found for: "TSH"  No results found for: "CHOL"   No results found for: "HGBA1C"  No results found for: "BLOODCX", "SPUTUMCULTUR", "GRAMSTAIN", "URINECX", "WOUNDCULT", "BODYFLUIDCUL", "MRSACULTURE", "INFLUAPCR", "INFLUBPCR", "RSVPCR", "Nancye Rump", "CDIFFTOXINB"    *-*-*-*-*-*-*-*-*-*-*-*-*-*-*-*-*-*-*-*-*-*-*-*-*-*-*-*-*-*-*-*-*-*-*-*-*-*-*-*-*-*-*-*-*-*-*-*-*-*-*-*-*-*-  PREVIOUS CARDIOLOGY & RADIOLOGY TEST RESULTS   I have personally reviewed pertinent results of cardiovascular tests noted below.     Results for orders placed during the hospital encounter of 02/10/21    Echo complete with contrast if indicated    Narrative  [facility] [address], [address] [place] [address]    Transthoracic Echocardiogram  2D, M-mode, Doppler, and Color Doppler    Study date:  10-Feb-2021    Patient: Diamond Turner  MR number: [id number]  Account number: [de-identified]  : 1950  Age: 72 years  Gender: Male  Status: Outpatient  Location: [facility] Echo Lab  Height: 72 in  Weight: 204 lb  BP: 138/ 80 mmHg    Indications: CAD    Diagnoses: I25.10 - Atherosclerotic heart disease of native coronary artery without angina pectoris    Sonographer:  DONAVAN Richards  Primary Physician:  Travis Marina DO  Referring Physician:  Yazan Grace DO  Group:  [facility]  Interpreting Physician: Di Bernal MD    SUMMARY    LEFT VENTRICLE:  Systolic function was normal. Ejection fraction was estimated to be 60 %. There were no regional wall motion abnormalities. Doppler parameters were consistent with abnormal left ventricular relaxation (grade 1 diastolic dysfunction). IVC, HEPATIC VEINS:  The inferior vena cava was dilated. The respirophasic change in diameter was more than 50%. HISTORY: PRIOR HISTORY: CAD, S/P PTCA, Dyslipidemia    PROCEDURE: The study was performed in the [address] [place] [address] Echo Lab. The transthoracic approach was used. The study included complete 2D imaging, M-mode, complete spectral Doppler, and color Doppler. The heart rate was 65 bpm, at the  start of the study. Images were obtained from the parasternal, apical, subcostal, and suprasternal notch acoustic windows. Echocardiographic views were limited due to lung interference. Image quality was adequate. LEFT VENTRICLE: Size was normal. Systolic function was normal. Ejection fraction was estimated to be 60 %. There were no regional wall motion abnormalities. Wall thickness was normal. No evidence of apical thrombus. DOPPLER: Doppler  parameters were consistent with abnormal left ventricular relaxation (grade 1 diastolic dysfunction). RIGHT VENTRICLE: The size was normal. Systolic function was normal.    LEFT ATRIUM: Size was normal.    RIGHT ATRIUM: Size was normal.    MITRAL VALVE: DOPPLER: There was no evidence for stenosis. There was no significant regurgitation. AORTIC VALVE: DOPPLER: There was no evidence for stenosis. There was no significant regurgitation. TRICUSPID VALVE: DOPPLER: There was no evidence for stenosis. There was no significant regurgitation. PULMONIC VALVE: DOPPLER: There was no evidence for stenosis. There was no significant regurgitation. PERICARDIUM: There was no pericardial effusion. AORTA: The root exhibited normal size. SYSTEMIC VEINS: IVC: The inferior vena cava was dilated. The respirophasic change in diameter was more than 50%. SYSTEM MEASUREMENT TABLES    2D  %FS: 29.86 %  AV Diam: 3.41 cm  EDV(Teich): 147.56 ml  EF(Teich): 56.43 %  ESV(Teich): 64.29 ml  IVSd: 1.12 cm  LA Diam: 4.04 cm  LAAs A2C: 17.19 cm2  LAAs A4C: 36.77 cm2  LAESV A-L A2C: 33.15 ml  LAESV A-L A4C: 122.57 ml  LAESV MOD A2C: 32.21 ml  LAESV MOD A4C: 117.38 ml  LAESV(A-L): 70.91 ml  LALs A2C: 7.57 cm  LALs A4C: 9.37 cm  LVEDV MOD A4C: 115.68 ml  LVEF MOD A4C: 57.75 %  LVESV MOD A4C: 48.88 ml  LVIDd: 5.5 cm  LVIDs: 3.86 cm  LVLd A4C: 9.32 cm  LVLs A4C: 8.09 cm  LVOT Diam: 2 cm  LVPWd: 1.07 cm  RAAs A4C: 18.78 cm2  RAESV A-L: 55.13 ml  RAESV MOD: 52.7 ml  RALs: 5.43 cm  RVIDd: 3.76 cm  RWT: 0.39  SV MOD A4C: 66.81 ml  SV(Teich): 83.27 ml    CW  AV CO: 26 L/min  AV Env. Ti: 317.79 ms  AV SV: 397.01 ml  AV VTI: 43.49 cm  AV Vmax: 2.01 m/s  AV Vmean: 1.36 m/s  AV maxP.25 mmHg  AV meanP.66 mmHg  HR: 65.48 BPM    MM  TAPSE: 2.69 cm    PW  WILLIE (VTI): 1.9 cm2  WILLIE Vmax: 1.92 cm2  E' Av.12 m/s  E' Lat: 0.11 m/s  E' Sept: 0.13 m/s  E/E' Av.69  E/E' Lat: 8.08  E/E' Sept: 7.34  LVCO Dopp: 5.37 L/min  LVOT Env. Ti: 293.05 ms  LVOT VTI: 26.43 cm  LVOT Vmax: 1.23 m/s  LVOT Vmean: 0.9 m/s  LVOT maxP.07 mmHg  LVOT meanPG: 3.62 mmHg  LVSV Dopp: 82.83 ml  MV A Fermin: 1 m/s  MV Dec Ford: 3.91 m/s2  MV DecT: 237.66 ms  MV E Fermin: 0.93 m/s  MV E/A Ratio: 0.93    [facility] Accredited Echocardiography Laboratory    Prepared and electronically signed by    Ruth Perez MD  Signed 10-Feb-2021 15:23:03    No results found for this or any previous visit. No results found for this or any previous visit. No results found for this or any previous visit. VAS delmy single level     [facility] REPORT  CLINICAL:  Indications:  Patient presents with right leg pain. Operative History:  Coronary artery stent placement  Risk Factors  The patient has history of HTN and Hyperlipidemia. Clinical  Right Pressure:  131/ mm Hg, Left Pressure:  146/ mm Hg. FINDINGS:     Segment       Rig  Left                            P    P    Ant. Tibial   203  188    Post. Tibial  223  190    Metatarsal    129  178    Great Toe      84  120             CONCLUSION:     Impression:  RIGHT LOWER LIMB  Ankle/Brachial Index: 1.53 which is in the unreliable range. No Prior. PVR Tracings are normal. PPG tracings are dampened. Metatarsal Pressure 129 mmHg  Great Toe Pressure: 84 mmHg, within the healing range. LEFT LOWER LIMB  Ankle/Brachial Index: 1.30 which is in the normal range. No Prior  PVR Tracings are normal. PPG tracings are dampened. Metatarsal Pressure 178 mmHg  Great Toe Pressure: 120 mmHg, within the healing range. SIGNATURE:  Electronically Signed by: Nyasia Nelson on 2022-12-15 12:05:27 PM        *-*-*-*-*-*-*-*-*-*-*-*-*-*-*-*-*-*-*-*-*-*-*-*-*-*-*-*-*-*-*-*-*-*-*-*-*-*-*-*-*-*-*-*-*-*-*-*-*-*-*-*-*-*-  SIGNATURES:   [unfilled]   Sharmin Toscano MD; [facility]    *-*-*-*-*-*-*-*-*-*-*-*-*-*-*-*-*-*-*-*-*-*-*-*-*-*-*-*-*-*-*-*-*-*-*-*-*-*-*-*-*-*-*-*-*-*-*-*-*-*-*-*-*-*-  PAST MEDICAL HISTORY:  Past Medical History:   Diagnosis Date   • Abnormal stress test    • Coronary artery disease     BLANCA to LAD(m) 11/19/2020. • Depression    • Dyslipidemia 11/19/2020   • History of BPH    • Inguinal hernia     Left     PAST SURGICAL HISTORY:  Past Surgical History:   Procedure Laterality Date   • CARDIAC CATHETERIZATION  11/19/2020    99% LAD(m) after D1. Residual 50% LCx(d) and 50% RtPDA.     • CORONARY ANGIOPLASTY WITH STENT PLACEMENT 2020    BLANCA to LAD(m). • HERNIA REPAIR     • LUMBAR DISC SURGERY     • OR LAPAROSCOPY SURG RPR INITIAL INGUINAL HERNIA Left 2021    Procedure: ROBOTIC ASSISTED LAPAROSCOPIC INGUINAL HERNIA REPAIR WITH MESH;  Surgeon: Ifeoma Correa DO;  Location:  MAIN OR;  Service: General   • PROSTATE SURGERY      laser core   • WRIST SURGERY Right          FAMILY HISTORY:  Family History   Problem Relation Age of Onset   • Breast cancer Mother          at age 67   • No Known Problems Sister    • No Known Problems Brother    • No Known Problems Brother    • No Known Problems Brother     SOCIAL HISTORY:  Social History     Tobacco Use   Smoking Status Never   Smokeless Tobacco Never      Social History     Substance and Sexual Activity   Alcohol Use Yes   • Alcohol/week: 1.0 standard drink of alcohol   • Types: 1 Glasses of wine per week    Comment: socially      Social History     Substance and Sexual Activity   Drug Use Never    [unfilled]     *-*-*-*-*-*-*-*-*-*-*-*-*-*-*-*-*-*-*-*-*-*-*-*-*-*-*-*-*-*-*-*-*-*-*-*-*-*-*-*-*-*-*-*-*-*-*-*-*-*-*-*-*-*  ALLERGIES:  Allergies   Allergen Reactions   • Amoxicillin Rash    CURRENT SCHEDULED MEDICATIONS:    Current Outpatient Medications:   •  aspirin (ECOTRIN LOW STRENGTH) 81 mg EC tablet, Take 81 mg by mouth daily Pt was to continue taking d/t heart stents, Disp: , Rfl:   •  atorvastatin (LIPITOR) 40 mg tablet, Take 1 tablet (40 mg total) by mouth daily with dinner, Disp: 90 tablet, Rfl: 3  •  losartan (COZAAR) 25 mg tablet, TAKE 1 TABLET (25 MG TOTAL) BY MOUTH DAILY. , Disp: 90 tablet, Rfl: 4  •  meloxicam (MOBIC) 15 mg tablet, Take 15 mg by mouth daily As needed, Disp: , Rfl:   •  metoprolol succinate (TOPROL-XL) 25 mg 24 hr tablet, Take 0.5 tablets (12.5 mg total) by mouth daily at bedtime, Disp: 45 tablet, Rfl: 3  •  nitroglycerin (NITROSTAT) 0.4 mg SL tablet, Place 1 tablet (0.4 mg total) under the tongue every 5 (five) minutes as needed for chest pain, Disp: 15 tablet, Rfl: 0  •  Omega-3 Fatty Acids (fish oil) 1,000 mg, Fish Oil, Disp: , Rfl:      *-*-*-*-*-*-*-*-*-*-*-*-*-*-*-*-*-*-*-*-*-*-*-*-*-*-*-*-*-*-*-*-*-*-*-*-*-*-*-*-*-*-*-*-*-*-*-*-*-*-*-*-*-*

## 2023-09-07 NOTE — ASSESSMENT & PLAN NOTE
Mr. Pio Carranza is overall stable from cardiac perspective though he has been experiencing some transient chest discomfort with exertion. This has not affected his activities of daily living but he does have concerns and he is avoiding overexertion. His blood pressure is reasonably well controlled. On physical examination there is no evidence of pulmonary or peripheral vascular congestion. His lipids are well controlled. He has not had any ischemic testing since his PCI. He his last echocardiogram was in 2021 and he had mild diastolic dysfunction. Considering his symptoms and history of residual disease and as he is concerned about residual CAD it would be reasonable to do a stress test.    -- Am requesting an exercise nuclear stress test and an echocardiogram to assess his cardiac structure and function. -- I am advising him to continue normal activities including walking and yoga. He is advised to keep a diary of symptoms and to take a sublingual nitroglycerin if he gets chest discomfort. He should avoid driving or walking after taking the nitroglycerin as it may drop his blood pressure. -- If he develops symptoms at rest or with normal activities at home then he is advised to call us or go to the emergency room. -- I do want to see him in 3 months time after his stress test and echocardiogram.  -- Dietary and medical compliance are reinforced. -- He is advised  to report any concerning symptoms such as chest pain, shortness of breath, decline in exercise tolerance or presyncope/syncope. -- I am providing him some tips about nonpharmacologic measures to improve blood pressures.

## 2023-09-07 NOTE — PATIENT INSTRUCTIONS
CARDIOLOGY ASSESSMENT & PLAN     1. Atherosclerosis of native coronary artery of native heart with angina pectoris ([address])  NM myocardial perfusion spect (rx stress and/or rest)    Echo complete w/ contrast if indicated      2. Coronary artery disease involving native coronary artery of native heart without angina pectoris        3. Near syncope        4. Bradycardia        5. S/P coronary artery stent placement        6. Mixed hyperlipidemia        7. Stage 3a chronic kidney disease (HCC)          Atherosclerosis of native coronary artery of native heart with angina pectoris [facility])  Mr. Sage Cordoba is overall stable from cardiac perspective though he has been experiencing some transient chest discomfort with exertion. This has not affected his activities of daily living but he does have concerns and he is avoiding overexertion. His blood pressure is reasonably well controlled. On physical examination there is no evidence of pulmonary or peripheral vascular congestion. His lipids are well controlled. He has not had any ischemic testing since his PCI. He his last echocardiogram was in 2021 and he had mild diastolic dysfunction. Considering his symptoms and history of residual disease and as he is concerned about residual CAD it would be reasonable to do a stress test.    -- Am requesting an exercise nuclear stress test and an echocardiogram to assess his cardiac structure and function. -- I am advising him to continue normal activities including walking and yoga. He is advised to keep a diary of symptoms and to take a sublingual nitroglycerin if he gets chest discomfort. He should avoid driving or walking after taking the nitroglycerin as it may drop his blood pressure. -- If he develops symptoms at rest or with normal activities at home then he is advised to call us or go to the emergency room.   -- I do want to see him in 3 months time after his stress test and echocardiogram.  -- Dietary and medical compliance are reinforced. -- He is advised  to report any concerning symptoms such as chest pain, shortness of breath, decline in exercise tolerance or presyncope/syncope. -- I am providing him some tips about nonpharmacologic measures to improve blood pressures.

## 2023-09-26 ENCOUNTER — HOSPITAL ENCOUNTER (OUTPATIENT)
Dept: NON INVASIVE DIAGNOSTICS | Facility: HOSPITAL | Age: 73
Discharge: HOME | End: 2023-09-26
Attending: INTERNAL MEDICINE
Payer: MEDICARE

## 2023-09-26 ENCOUNTER — HOSPITAL ENCOUNTER (OUTPATIENT)
Dept: NUCLEAR MEDICINE | Facility: HOSPITAL | Age: 73
Discharge: HOME | End: 2023-09-26
Attending: INTERNAL MEDICINE
Payer: MEDICARE

## 2023-09-26 ENCOUNTER — HOSPITAL ENCOUNTER (OUTPATIENT)
Dept: NON INVASIVE DIAGNOSTICS | Facility: HOSPITAL | Age: 73
Discharge: HOME | End: 2023-09-26
Attending: INTERNAL MEDICINE

## 2023-09-26 VITALS
HEART RATE: 68 BPM | BODY MASS INDEX: 29.12 KG/M2 | HEIGHT: 72 IN | DIASTOLIC BLOOD PRESSURE: 84 MMHG | WEIGHT: 215 LBS | SYSTOLIC BLOOD PRESSURE: 132 MMHG

## 2023-09-26 DIAGNOSIS — I25.119 ATHEROSCLEROSIS OF NATIVE CORONARY ARTERY OF NATIVE HEART WITH ANGINA PECTORIS (HCC): ICD-10-CM

## 2023-09-26 LAB
AORTIC ROOT: 3.4 CM
AORTIC VALVE MEAN VELOCITY: 10.5 M/S
APICAL FOUR CHAMBER EJECTION FRACTION: 55 %
ASCENDING AORTA: 2.9 CM
AV AREA BY CONTINUOUS VTI: 2.3 CM2
AV AREA PEAK VELOCITY: 2.4 CM2
AV LVOT MEAN GRADIENT: 1 MMHG
AV LVOT PEAK GRADIENT: 3 MMHG
AV MEAN GRADIENT: 5 MMHG
AV PEAK GRADIENT: 11 MMHG
AV VALVE AREA: 2.31 CM2
AV VELOCITY RATIO: 0.49
CHEST PAIN STATEMENT: NORMAL
DOP CALC AO PEAK VEL: 1.62 M/S
DOP CALC AO VTI: 38.21 CM
DOP CALC LVOT AREA: 4.91 CM2
DOP CALC LVOT CARDIAC INDEX: 2.35 L/MIN/M2
DOP CALC LVOT CARDIAC OUTPUT: 5.17 L/MIN
DOP CALC LVOT DIAMETER: 2.5 CM
DOP CALC LVOT PEAK VEL VTI: 18.02 CM
DOP CALC LVOT PEAK VEL: 0.8 M/S
DOP CALC LVOT STROKE INDEX: 40 ML/M2
DOP CALC LVOT STROKE VOLUME: 88.41
E WAVE DECELERATION TIME: 165 MS
FRACTIONAL SHORTENING: 23 (ref 28–44)
INTERVENTRICULAR SEPTUM IN DIASTOLE (PARASTERNAL SHORT AXIS VIEW): 1.1 CM
INTERVENTRICULAR SEPTUM: 1.1 CM (ref 0.6–1.1)
LAAS-AP2: 23.2 CM2
LAAS-AP4: 20.8 CM2
LEFT ATRIUM SIZE: 3.5 CM
LEFT ATRIUM VOLUME (MOD BIPLANE): 63 ML
LEFT INTERNAL DIMENSION IN SYSTOLE: 3.3 CM (ref 2.1–4)
LEFT VENTRICLE DIASTOLIC VOLUME (MOD BIPLANE): 112 ML
LEFT VENTRICLE SYSTOLIC VOLUME (MOD BIPLANE): 42 ML
LEFT VENTRICULAR INTERNAL DIMENSION IN DIASTOLE: 4.3 CM (ref 3.5–6)
LEFT VENTRICULAR POSTERIOR WALL IN END DIASTOLE: 1.3 CM
LEFT VENTRICULAR STROKE VOLUME: 41 ML
LV EF: 62 %
LVSV (TEICH): 41 ML
MAX DIASTOLIC BP: 82 MMHG
MAX HEART RATE: 142 BPM
MAX HR PERCENT: 95 %
MAX HR: 142 BPM
MAX PREDICTED HEART RATE: 148 BPM
MAX. SYSTOLIC BP: 170 MMHG
MV E'TISSUE VEL-LAT: 7 CM/S
MV E'TISSUE VEL-SEP: 8 CM/S
MV PEAK A VEL: 0.44 M/S
MV PEAK E VEL: 35 CM/S
MV STENOSIS PRESSURE HALF TIME: 48 MS
MV VALVE AREA P 1/2 METHOD: 4.58
PROTOCOL NAME: NORMAL
PV PEAK GRADIENT: 4 MMHG
RATE PRESSURE PRODUCT: NORMAL
REASON FOR TERMINATION: NORMAL
RIGHT ATRIUM AREA SYSTOLE A4C: 18.3 CM2
RIGHT VENTRICLE ID DIMENSION: 3.8 CM
SL CV LEFT ATRIUM LENGTH A2C: 6.7 CM
SL CV PED ECHO LEFT VENTRICLE DIASTOLIC VOLUME (MOD BIPLANE) 2D: 83 ML
SL CV PED ECHO LEFT VENTRICLE SYSTOLIC VOLUME (MOD BIPLANE) 2D: 43 ML
SL CV REST NUCLEAR ISOTOPE DOSE: 11 MCI
SL CV STRESS NUCLEAR ISOTOPE DOSE: 32.8 MCI
SL CV STRESS RECOVERY BP: NORMAL MMHG
SL CV STRESS RECOVERY HR: 78 BPM
SL CV STRESS RECOVERY O2 SAT: 98 %
SL CV STRESS STAGE REACHED: 3
STRESS ANGINA INDEX: 0
STRESS BASELINE BP: NORMAL MMHG
STRESS BASELINE HR: 60 BPM
STRESS DUKE TREADMILL SCORE: 7
STRESS O2 SAT REST: 98 %
STRESS PEAK HR: 142 BPM
STRESS POST ESTIMATED WORKLOAD: 9.7 METS
STRESS POST EXERCISE DUR MIN: 6 MIN
STRESS POST EXERCISE DUR SEC: 54 SEC
STRESS POST O2 SAT PEAK: 97 %
STRESS POST PEAK BP: 178 MMHG
STRESS ST DEPRESSION: 0.5 MM
STRESS/REST PERFUSION RATIO: 1.04
TARGET HR FORMULA: NORMAL
TEST INDICATION: NORMAL
TIME IN EXERCISE PHASE: NORMAL
TRICUSPID ANNULAR PLANE SYSTOLIC EXCURSION: 2.4 CM

## 2023-09-26 PROCEDURE — 93306 TTE W/DOPPLER COMPLETE: CPT | Performed by: INTERNAL MEDICINE

## 2023-09-26 PROCEDURE — 93306 TTE W/DOPPLER COMPLETE: CPT

## 2023-09-26 PROCEDURE — 78452 HT MUSCLE IMAGE SPECT MULT: CPT

## 2023-09-26 PROCEDURE — 93016 CV STRESS TEST SUPVJ ONLY: CPT | Performed by: INTERNAL MEDICINE

## 2023-09-26 PROCEDURE — 93017 CV STRESS TEST TRACING ONLY: CPT

## 2023-09-26 PROCEDURE — 78452 HT MUSCLE IMAGE SPECT MULT: CPT | Performed by: INTERNAL MEDICINE

## 2023-09-26 PROCEDURE — A9502 TC99M TETROFOSMIN: HCPCS

## 2023-09-26 PROCEDURE — 93018 CV STRESS TEST I&R ONLY: CPT | Performed by: INTERNAL MEDICINE

## 2023-09-26 PROCEDURE — G1004 CDSM NDSC: HCPCS

## 2023-12-09 ENCOUNTER — OFFICE VISIT (OUTPATIENT)
Dept: URGENT CARE | Facility: CLINIC | Age: 73
End: 2023-12-09
Payer: MEDICARE

## 2023-12-09 VITALS
TEMPERATURE: 97 F | RESPIRATION RATE: 18 BRPM | HEART RATE: 85 BPM | BODY MASS INDEX: 28.44 KG/M2 | OXYGEN SATURATION: 96 % | HEIGHT: 72 IN | SYSTOLIC BLOOD PRESSURE: 138 MMHG | WEIGHT: 210 LBS | DIASTOLIC BLOOD PRESSURE: 82 MMHG

## 2023-12-09 DIAGNOSIS — J20.9 ACUTE BRONCHITIS, UNSPECIFIED ORGANISM: Primary | ICD-10-CM

## 2023-12-09 PROCEDURE — 99213 OFFICE O/P EST LOW 20 MIN: CPT

## 2023-12-09 PROCEDURE — G0463 HOSPITAL OUTPT CLINIC VISIT: HCPCS

## 2023-12-09 RX ORDER — ALBUTEROL SULFATE 90 UG/1
2 AEROSOL, METERED RESPIRATORY (INHALATION) EVERY 6 HOURS PRN
Qty: 8.5 G | Refills: 0 | Status: SHIPPED | OUTPATIENT
Start: 2023-12-09

## 2023-12-09 RX ORDER — DOXYCYCLINE 100 MG/1
100 TABLET ORAL 2 TIMES DAILY
Qty: 10 TABLET | Refills: 0 | Status: SHIPPED | OUTPATIENT
Start: 2023-12-09 | End: 2023-12-14

## 2023-12-09 NOTE — PROGRESS NOTES
[facility]        NAME: Medina Sheth is a 68 y.o. male  : 1950    MRN: [id number]  DATE: 2023  TIME: 10:53 AM    Assessment and Plan   Acute bronchitis, unspecified organism [J20.9]  1. Acute bronchitis, unspecified organism  doxycycline (ADOXA) 100 MG tablet    albuterol (ProAir HFA) 90 mcg/act inhaler            Patient Instructions   Take the antibiotics with a full glass of water and food  Use the inhaler as needed for shortness of breath and wheezing. Use a warm mist humidifier or vaporizer  Hot tea with honey. Warm saline gargle or throat lozenge may help with a sore throat. OTC saline nasal sprays   Drink plenty of fluids. Follow up with PCP in 3-5 days. Proceed to  ER if symptoms worsen. Chief Complaint     Chief Complaint   Patient presents with    Cold Like Symptoms     Cough, sinus and chest congestion , sore throat, post nasal drip and extreme fatigue x 5 days. Covid test negative x 2          History of Present Illness       Cough  This is a new problem. The current episode started in the past 7 days. The problem has been gradually worsening. The problem occurs constantly. The cough is Productive of sputum. Associated symptoms include chills, nasal congestion, postnasal drip, shortness of breath and wheezing. Pertinent negatives include no chest pain or fever. There is no history of asthma or COPD. Review of Systems   Review of Systems   Constitutional:  Positive for chills. Negative for activity change, appetite change and fever. HENT:  Positive for congestion and postnasal drip. Respiratory:  Positive for cough, chest tightness, shortness of breath and wheezing. Cardiovascular:  Negative for chest pain and palpitations. Gastrointestinal:  Negative for diarrhea and vomiting. All other systems reviewed and are negative.         Current Medications       Current Outpatient Medications:     albuterol (ProAir HFA) 90 mcg/act inhaler, Inhale 2 puffs every 6 (six) hours as needed for wheezing, Disp: 8.5 g, Rfl: 0    aspirin (ECOTRIN LOW STRENGTH) 81 mg EC tablet, Take 81 mg by mouth daily Pt was to continue taking d/t heart stents, Disp: , Rfl:     atorvastatin (LIPITOR) 40 mg tablet, Take 1 tablet (40 mg total) by mouth daily with dinner, Disp: 90 tablet, Rfl: 3    doxycycline (ADOXA) 100 MG tablet, Take 1 tablet (100 mg total) by mouth 2 (two) times a day for 5 days, Disp: 10 tablet, Rfl: 0    losartan (COZAAR) 25 mg tablet, TAKE 1 TABLET (25 MG TOTAL) BY MOUTH DAILY. , Disp: 90 tablet, Rfl: 4    meloxicam (MOBIC) 15 mg tablet, Take 15 mg by mouth daily As needed, Disp: , Rfl:     metoprolol succinate (TOPROL-XL) 25 mg 24 hr tablet, Take 0.5 tablets (12.5 mg total) by mouth daily at bedtime, Disp: 45 tablet, Rfl: 3    nitroglycerin (NITROSTAT) 0.4 mg SL tablet, Place 1 tablet (0.4 mg total) under the tongue every 5 (five) minutes as needed for chest pain, Disp: 15 tablet, Rfl: 0    Omega-3 Fatty Acids (fish oil) 1,000 mg, Fish Oil, Disp: , Rfl:     Current Allergies     Allergies as of 12/09/2023 - Reviewed 12/09/2023   Allergen Reaction Noted    Amoxicillin Rash 11/19/2020            The following portions of the patient's history were reviewed and updated as appropriate: allergies, current medications, past family history, past medical history, past social history, past surgical history and problem list.     Past Medical History:   Diagnosis Date    Abnormal stress test     Coronary artery disease     BLANCA to LAD(m) 11/19/2020. Depression     Dyslipidemia 11/19/2020    Inguinal hernia     Left        Past Surgical History:   Procedure Laterality Date    CARDIAC CATHETERIZATION  11/19/2020    99% LAD(m) after D1. Residual 50% LCx(d) and 50% RtPDA. CORONARY ANGIOPLASTY WITH STENT PLACEMENT  11/19/2020    BLANCA to LAD(m).     HERNIA REPAIR      LUMBAR DISC SURGERY      NH LAPAROSCOPY SURG RPR INITIAL INGUINAL HERNIA Left 11/12/2021    Procedure: ROBOTIC ASSISTED LAPAROSCOPIC INGUINAL HERNIA REPAIR WITH MESH;  Surgeon: Yue Garcia DO;  Location:  MAIN OR;  Service: General    PROSTATE SURGERY      laser core    WRIST SURGERY Right        Family History   Problem Relation Age of Onset    Breast cancer Mother          at age 67    No Known Problems Sister     No Known Problems Brother     No Known Problems Brother     No Known Problems Brother          Medications have been verified. Objective   /82   Pulse 85   Temp (!) 97 °F (36.1 °C)   Resp 18   Ht 6' (1.829 m)   Wt 95.3 kg (210 lb)   SpO2 96%   BMI 28.48 kg/m²        Physical Exam     Physical Exam  Vitals and nursing note reviewed. Constitutional:       General: He is not in acute distress. Appearance: Normal appearance. He is normal weight. He is not ill-appearing or toxic-appearing. HENT:      Right Ear: Tympanic membrane normal.      Left Ear: Tympanic membrane normal.      Mouth/Throat:      Pharynx: Oropharynx is clear. Cardiovascular:      Rate and Rhythm: Normal rate and regular rhythm. Pulses: Normal pulses. Heart sounds: Normal heart sounds. Pulmonary:      Effort: Pulmonary effort is normal.      Breath sounds: Rhonchi present. Skin:     General: Skin is warm and dry. Capillary Refill: Capillary refill takes less than 2 seconds. Neurological:      General: No focal deficit present. Mental Status: He is alert and oriented to person, place, and time.

## 2023-12-09 NOTE — PATIENT INSTRUCTIONS
Take the antibiotics with a full glass of water and food  Use the inhaler as needed for shortness of breath and wheezing. Use a warm mist humidifier or vaporizer  Hot tea with honey. Warm saline gargle or throat lozenge may help with a sore throat. OTC saline nasal sprays   Drink plenty of fluids.

## 2024-01-31 DIAGNOSIS — Z95.5 S/P CORONARY ARTERY STENT PLACEMENT: ICD-10-CM

## 2024-02-04 RX ORDER — METOPROLOL SUCCINATE 25 MG/1
12.5 TABLET, EXTENDED RELEASE ORAL
Qty: 45 TABLET | Refills: 3 | Status: SHIPPED | OUTPATIENT
Start: 2024-02-04

## 2024-02-24 DIAGNOSIS — R03.0 ELEVATED BLOOD-PRESSURE READING, WITHOUT DIAGNOSIS OF HYPERTENSION: ICD-10-CM

## 2024-02-26 RX ORDER — LOSARTAN POTASSIUM 25 MG/1
25 TABLET ORAL DAILY
Qty: 90 TABLET | Refills: 4 | Status: SHIPPED | OUTPATIENT
Start: 2024-02-26

## 2024-04-16 ENCOUNTER — OFFICE VISIT (OUTPATIENT)
Dept: CARDIOLOGY CLINIC | Facility: CLINIC | Age: 74
End: 2024-04-16
Payer: MEDICARE

## 2024-04-16 VITALS
HEART RATE: 66 BPM | BODY MASS INDEX: 28.31 KG/M2 | DIASTOLIC BLOOD PRESSURE: 80 MMHG | OXYGEN SATURATION: 99 % | HEIGHT: 72 IN | SYSTOLIC BLOOD PRESSURE: 148 MMHG | WEIGHT: 209 LBS

## 2024-04-16 DIAGNOSIS — I10 PRIMARY HYPERTENSION: ICD-10-CM

## 2024-04-16 DIAGNOSIS — I25.10 ATHEROSCLEROSIS OF NATIVE CORONARY ARTERY OF NATIVE HEART WITHOUT ANGINA PECTORIS: Primary | ICD-10-CM

## 2024-04-16 PROCEDURE — G2211 COMPLEX E/M VISIT ADD ON: HCPCS | Performed by: INTERNAL MEDICINE

## 2024-04-16 PROCEDURE — 99214 OFFICE O/P EST MOD 30 MIN: CPT | Performed by: INTERNAL MEDICINE

## 2024-04-16 RX ORDER — NITROGLYCERIN 0.4 MG/1
0.4 TABLET SUBLINGUAL
Qty: 15 TABLET | Refills: 3 | Status: SHIPPED | OUTPATIENT
Start: 2024-04-16

## 2024-04-16 NOTE — ASSESSMENT & PLAN NOTE
Stable CAD with no angina.  Last nuclear treadmill stress test in September 2023 showed no evidence of reversible ischemia and normal left ventricular systolic function.  Since patient is quite active with no angina, we will continue to follow him clinically.  No indication for repeat stress testing at this time.  Echocardiogram in September 2023 showed normal left ventricular systolic function with no significant valvular pathology.  Most recent lipid profile showed excellent LDL.

## 2024-04-16 NOTE — PROGRESS NOTES
[facility] [place]  [address]  [address]  [facility] [id number] [phone]    Patient Name: Logn Agosto  YOB: 1950 ;male  MR No: [id number]      Diagnosis ICD-10-CM Associated Orders   1. Atherosclerosis of native coronary artery of native heart without angina pectoris  I25.10 nitroglycerin (NITROSTAT) 0.4 mg SL tablet      2. Primary hypertension  I10           ASSESSMENT AND RECOMMENDATIONS:  1. Atherosclerosis of native coronary artery of native heart without angina pectoris  Assessment & Plan:  Stable CAD with no angina.  Last nuclear treadmill stress test in September 2023 showed no evidence of reversible ischemia and normal left ventricular systolic function.  Since patient is quite active with no angina, we will continue to follow him clinically.  No indication for repeat stress testing at this time.  Echocardiogram in September 2023 showed normal left ventricular systolic function with no significant valvular pathology.  Most recent lipid profile showed excellent LDL.    Orders:  -     nitroglycerin (NITROSTAT) 0.4 mg SL tablet; Place 1 tablet (0.4 mg total) under the tongue every 5 (five) minutes as needed for chest pain    2. Primary hypertension  Assessment & Plan:  I have advised him to consider increasing his losartan to 50 mg daily with a target blood pressure of 120/80.  He will continue to keep home blood pressure diary.           PRESENTING COMPLAINT:  Coronary artery disease, hypertension    HISTORY OF PRESENT ILLNESS:    73-year-old male with past medical history significant for hypertension, coronary artery disease status post PCI to 99% mid LAD in November 2020 with 50% distal circumflex and 50% right PDA disease, presents for routine follow-up.  Patient remains very active at home with regular exercise and denies any chest pain, dyspnea on exertion, palpitations or syncope.  He reports that his home blood pressure  numbers are often in the 130/75-80 range.    CURRENT  MEDICATIONS:      Current Outpatient Medications:     aspirin (ECOTRIN LOW STRENGTH) 81 mg EC tablet, Take 81 mg by mouth daily Pt was to continue taking d/t heart stents, Disp: , Rfl:     atorvastatin (LIPITOR) 40 mg tablet, Take 1 tablet (40 mg total) by mouth daily with dinner, Disp: 90 tablet, Rfl: 3    losartan (COZAAR) 25 mg tablet, TAKE 1 TABLET (25 MG TOTAL) BY MOUTH DAILY., Disp: 90 tablet, Rfl: 4    metoprolol succinate (TOPROL-XL) 25 mg 24 hr tablet, TAKE 1/2 TABLET (12.5 MG TOTAL) BY MOUTH DAILY AT BEDTIME, Disp: 45 tablet, Rfl: 3    nitroglycerin (NITROSTAT) 0.4 mg SL tablet, Place 1 tablet (0.4 mg total) under the tongue every 5 (five) minutes as needed for chest pain, Disp: 15 tablet, Rfl: 3    Omega-3 Fatty Acids (fish oil) 1,000 mg, Fish Oil, Disp: , Rfl:     ALLERGIES  Allergies   Allergen Reactions    Amoxicillin Rash       Lab Results   Component Value Date    LDLCALC 39 12/19/2022    LDLCALC 31 03/16/2021    HDL 68 12/19/2022    HDL 58 03/16/2021    CHOLESTEROL 141 12/19/2022    CHOLESTEROL 105 03/16/2021    TRIG 171 (H) 12/19/2022    TRIG 78 03/16/2021    CREATININE 1.0 11/20/2023    CREATININE 1.05 12/19/2022    K 4.5 11/20/2023    K 4.2 12/19/2022    SODIUM 143 11/20/2023    SODIUM 141 12/19/2022    TSH 1.11 07/09/2020         REVIEW OF SYSTEMS   Positive for: Osteoarthritis  Negative for: All remaining as reviewed below and in HPI.    SYSTEM SYMPTOMS REVIEWED:  General--weight change, fever, night sweats  Respiratory--cough, wheezing, shortness of breath, sputum production  Cardiovascular--chest pain, syncope, dyspnea on exertion, edema, decline in exercise tolerance, claudication   Gastrointestinal--persistent vomiting, diarrhea, abdominal distention, blood in stool   Muscular or skeletal--joint pain or swelling   Neurologic--headaches, syncope, abnormal movement  Hematologic--history of easy bruising and bleeding   Endocrine--thyroid  "enlargement, heat or cold intolerance, polyuria   Psychiatric--anxiety, depression     General physical examination:    General appearance: Alert, no acute distress, appears stated age, overweight/obese.  H EENT: Mucous membranes are moist.  No obvious abnormality noted.  Neck: Supple with no lymphadenopathy.  No JVD.  Carotid pulses are intact.  No carotid bruit.  Cardiovascular system: Regular rhythm.  Normal S1 and S2.  No murmurs.  No rubs or gallops. Extremities: No edema. No cyanosis.  Pulmonary: Respirations unlabored.  Good air entry bilaterally.  Clear to auscultation bilaterally.  Gastrointestinal: Abdomen is soft and nontender.  Bowel sounds are positive.  Musculoskeletal: Upper Extremities: Normal upper motor strength. Lower Extremity: Normal motor strength. Gait: Normal.   Skin: Skin is warm. No rashes or lesions.  Neurological: Patient is alert and oriented with no gross motor deficits.  Psychiatric: Mood is normal.  Behavior is normal.    Vitals:    04/16/24 1131   BP: 148/80   Pulse: 66   SpO2: 99%      Body mass index is 28.35 kg/m².  Wt Readings from Last 3 Encounters:   04/16/24 94.8 kg (209 lb)   12/09/23 95.3 kg (210 lb)   09/26/23 97.5 kg (215 lb)             Reji Barnett MD, FACC, HALIE    Portions of the record  have been created with voice recognition software.  Occasional grammatical mistakes or wrong word or \"sound alike\" substitutions may have occurred due to the inherent limitations of voice recognition software. Please reach out to me directly for any clarifications.   "

## 2024-04-16 NOTE — ASSESSMENT & PLAN NOTE
I have advised him to consider increasing his losartan to 50 mg daily with a target blood pressure of 120/80.  He will continue to keep home blood pressure diary.

## 2024-05-14 ENCOUNTER — TELEPHONE (OUTPATIENT)
Age: 74
End: 2024-05-14

## 2024-05-14 NOTE — TELEPHONE ENCOUNTER
Patient is scheduled for a colonoscopy on 6/26/24 with [facility].    Please advise if ok to hold aspirin for 7 days prior to the procedure.

## 2024-05-28 DIAGNOSIS — Z95.5 S/P CORONARY ARTERY STENT PLACEMENT: ICD-10-CM

## 2024-05-28 RX ORDER — ATORVASTATIN CALCIUM 40 MG/1
40 TABLET, FILM COATED ORAL
Qty: 90 TABLET | Refills: 3 | Status: SHIPPED | OUTPATIENT
Start: 2024-05-28

## 2024-08-28 NOTE — PROGRESS NOTES
Cardiology Follow Up    Long Agosto  1950  [id number]  [facility] [place] [address] [address]  [address]  [facility] [id number] [phone]    Nile presents for routine follow up of CAD, HTN, HLD.    1. Atherosclerosis of native coronary artery of native heart without angina pectoris  Assessment & Plan:  Coronary Artery Disease:   A. Abnormal stress echocardiogram 11/12/2020 with LAD wall motion abnormality.   B. Cardiac catheterization 11/19/2020 showed a 99% stenosed mid LAD lesion which was stented with BLANCA.   C. Residual 50% LCx(d) and 50% RtPDA disease.   - - - - - - - -  Patient is doing well post stenting.   He denies any recurrent chest pain.   He completed 1 year of DAPT with aspirin and clopidogrel.   Continue aspirin 81 mg daily lifelong.  Echocardiogram and nuclear stress testing 9/2023 showed no concerning findings.  Continue metoprolol succinate 12.5 mg daily, losartan 25 mg daily  Continue atorvastatin 40 mg daily for goal LDL < 70.   Orders:  -     POCT ECG  2. S/P coronary artery stent placement  Assessment & Plan:  See discussion under CAD.  Orders:  -     POCT ECG  3. Near syncope  Assessment & Plan:  No recurrence with BP regulation and down-titration of beta blocker.  Orders:  -     POCT ECG  4. Bradycardia  Assessment & Plan:  Patient previously noted intermittent episodes of bradycardia with heart rates in the 40s at home.  He also noted associated near-syncope with some of these episodes.  ZIO 3/2021 showed an average heart rate of 64 bpm (other details as outlined in results).  Metoprolol succinate was decreased to 12.5 mg daily.  ECG today shows SB at 54 bpm.  He is asymptomatic at this time. Will monitor.  Orders:  -     POCT ECG  5. Primary hypertension  Assessment & Plan:  Blood pressure is excellent.  Continue losartan 25 mg daily and metoprolol succinate 12.5 mg daily  Orders:  -     POCT ECG  6. Mixed  hyperlipidemia  Assessment & Plan:  Lipid panel 11/2023 with LDL 42  Continue atorvastatin 40 mg daily.  Goal LDL < 70.    Orders:  -     POCT ECG  7. Localized edema  Assessment & Plan:  Trivial left ankle edema most consistent with venous insufficiency.  Encouraged use of compression socks.  No symptoms of concern at this time     HPI  Nile has a past medical history of CAD s/p BLANCA 11/19/2020, HLD, depression, BPH, and left inguinal hernia.     He initially established with [facility] after undergoing an abnormal stress echocardiogram on 11/12/2020 for symptoms of chest pain radiating to his jaw with exertion.  He was sent for elective cardiac catheterization performed at [facility] on 11/19/2020 with full details below.     He has done well post PCI. His metoprolol succinate was reduced to 12.5mg in the spring due to c/o lightheadedness with resolution of symptoms. Losartan 25 mg daily was added in 12/2022 for BP optimization.    He followed up with Dr. Sharmin Toscano at our office in September 2023 at which time he reported transient exertional chest discomfort. An updated stress test and echo were ordered.   Lexiscan nuclear stress test 9/26/23 was negative for scar or ischemia.   Echo 9/26/2024 showed preserved LVEF with no obvious regional wall motion abnormality or valvular disease.    He followed up most recently with Dr. Reji Barnett 4/16/2024 at which time he was doing well.    8/29/2024: Nile presents today accompanied by his spouse for routine follow up. ECG shows SB at 54 bpm. He states he feels great. They recently returned from a trip to [place] and will be camping this fall. He walks several days per week. He plans to start a strength building program. He denies any exertional symptoms. No chest pain, shortness of breath. Activity levels are good. No bleeding issues.    Medical Problems       Problem List       Atherosclerosis of native coronary artery of native heart without angina pectoris    S/P  coronary artery stent placement    Mixed hyperlipidemia    Bradycardia    Near syncope    Primary hypertension    Localized edema    History of PTCA    Preop cardiovascular exam    Stage 3a chronic kidney disease (HCC)        Past Medical History:   Diagnosis Date    Abnormal stress test     Coronary artery disease     BLANCA to LAD(m) 11/19/2020.    Depression     Dyslipidemia 11/19/2020    Inguinal hernia     Left      Social History     Socioeconomic History    Marital status: /Civil Union     Spouse name: Not on file    Number of children: Not on file    Years of education: Not on file    Highest education level: Not on file   Occupational History    Occupation: Retired   Tobacco Use    Smoking status: Never    Smokeless tobacco: Never   Vaping Use    Vaping status: Never Used   Substance and Sexual Activity    Alcohol use: Yes     Alcohol/week: 1.0 standard drink of alcohol     Types: 1 Glasses of wine per week     Comment: socially     Drug use: Never    Sexual activity: Yes     Partners: Female   Other Topics Concern    Not on file   Social History Narrative    Not on file     Social Determinants of Health     Financial Resource Strain: Low Risk  (11/20/2023)    Received from [facility] [employer] [facility] [employer]    Financial Resource Strain     Do you have any trouble paying for your medications, or do you think you might in the future?: No     Does your family have trouble paying for medicine? (Household - for ages 0-17 years): Not on file   Food Insecurity: No Food Insecurity (11/20/2023)    Received from [facility]    Food Insecurity     Do you need food for this week?: No     Are you able to get enough food for your family? (Household - for ages 0-17 years): Not on file     Does your family need food this week? (Household - for ages 0-17 years): Not on file     Do you always have enough food for your family? (Household - for ages 0-17 years): Not on file   Transportation Needs: No Transportation Needs (11/20/2023)    Received  from [employer]    Transportation Needs     READ ONLY Do you have trouble getting a ride to medical visits or work?: Never True     Does your family have a hard time getting a ride to doctors’ visits? (Household - for ages 0-17 years): Not on file     Has lack of transportation kept you from medical appointments, meetings, work, or from getting things needed for daily living? Check all that apply. (Adult - for ages 18 years and over): Not on file     Do you (or your family) have trouble finding or paying for a ride (transportation)? (Household - for ages 0-17 years): Not on file   Physical Activity: Not on file   Stress: Not on file   Social Connections: Socially Integrated (2023)    Received from [employer]     How often do you feel lonely or isolated from those around you?: Never   Intimate Partner Violence: Not on file   Housing Stability: Low Risk  (2023)    Received from [employer]    Housing Stability     Do you currently live in a shelter or have no steady place to sleep at night?: No     READ ONLY Do you think you are at risk of becoming homeless?: No     Does your family worry about paying for your home or becoming homeless? (Household - for ages 0-17 years): Not on file     Are you homeless or worried that you might be in the future? (Adult - for ages 18 years and over): Not on file     Are you (or your family) homeless or worried that you might be in the future? (Household - for ages 0-17 years): Not on file      Family History   Problem Relation Age of Onset    Breast cancer Mother          at age 72    No Known Problems Sister     No Known Problems Brother     No Known Problems Brother     No Known Problems Brother      Past Surgical History:   Procedure Laterality Date    CARDIAC CATHETERIZATION  2020    99% LAD(m) after D1. Residual 50% LCx(d) and 50% RtPDA.     CORONARY ANGIOPLASTY WITH STENT PLACEMENT  2020    BLANCA to LAD(m).    HERNIA REPAIR      LUMBAR  DISC SURGERY      FL LAPAROSCOPY SURG RPR INITIAL INGUINAL HERNIA Left 11/12/2021    Procedure: ROBOTIC ASSISTED LAPAROSCOPIC INGUINAL HERNIA REPAIR WITH MESH;  Surgeon: Dannielle Rodriguez DO;  Location: [facility];  Service: General    PROSTATE SURGERY      laser core    WRIST SURGERY Right        Current Outpatient Medications:     aspirin (ECOTRIN LOW STRENGTH) 81 mg EC tablet, Take 81 mg by mouth daily Pt was to continue taking d/t heart stents, Disp: , Rfl:     atorvastatin (LIPITOR) 40 mg tablet, Take 1 tablet (40 mg total) by mouth daily with dinner, Disp: 90 tablet, Rfl: 3    losartan (COZAAR) 25 mg tablet, TAKE 1 TABLET (25 MG TOTAL) BY MOUTH DAILY., Disp: 90 tablet, Rfl: 4    metoprolol succinate (TOPROL-XL) 25 mg 24 hr tablet, TAKE 1/2 TABLET (12.5 MG TOTAL) BY MOUTH DAILY AT BEDTIME, Disp: 45 tablet, Rfl: 3    Multiple Vitamin (multivitamin) tablet, Take 1 tablet by mouth daily, Disp: , Rfl:     nitroglycerin (NITROSTAT) 0.4 mg SL tablet, Place 1 tablet (0.4 mg total) under the tongue every 5 (five) minutes as needed for chest pain, Disp: 15 tablet, Rfl: 3    Omega-3 Fatty Acids (fish oil) 1,000 mg, Fish Oil, Disp: , Rfl:   Allergies   Allergen Reactions    Amoxicillin Rash       Labs:     Chemistry        Component Value Date/Time    K 4.5 11/20/2023 0936     (H) 11/20/2023 0936    CO2 26 11/20/2023 0936    BUN 21 (H) 11/20/2023 0936    BUN 21 07/29/2022 1106    CREATININE 1.0 11/20/2023 0936        Component Value Date/Time    CALCIUM 9.6 11/20/2023 0936    ALKPHOS 56 11/20/2023 0936    AST 18 11/20/2023 0936    ALT 23 11/20/2023 0936        Lipid panel 11/20/2023: C 120. T 120. H 54. L 42.    Cardiac Test Results:   ECG 8/29/2024: Sinus bradycardia at 54 bpm. Otherwise normal ECG.    Lexiscan 9/26/2024:    Stress ECG: Up sloping ST depression of 0.5 mm in the inferolateral leads (II, III, aVF, V5 and V6) is noted. The ECG was negative for ischemia.  There were nondiagnostic ST-T wave  abnormalities. The stress ECG is negative for ischemia after maximal exercise, without reproduction of symptoms.  There was increasing frequency of ventricular ectopy with exercise and during recovery.    Perfusion: There is a left ventricular perfusion defect that is small to medium in size present in the basal inferior and inferoseptal location(s) that is paradoxical.    Stress Function: Left ventricular function post-stress is normal.  Although the calculated ejection fraction was 47% it is estimated as around 65% by visual assessment.  Normal regional wall motion was noted.    Stress Combined Conclusion: The ECG and SPECT imaging portions of the stress study are concordant with no evidence of stress induced myocardial ischemia. There is image artifact, without diagnostic evidence for perfusion abnormality.    Arrhythmias: Resting ECG showed occasional premature ventricular contractions.  There was occurance of occasional ventricular couplets ventricular triplets and rare premature atrial contractions with exercise and into recovery.  There was increase in ventricular ectopy with exercise.    No chest pain was reported with exercise.    Elevated resting blood pressure with appropriate blood pressure response to exercise.    Echo 9/26/2024:  Borderline increased left ventricular thickness, normal left ventricle systolic function, grade 1 diastolic dysfunction.  Left ventricular ejection fraction is estimated as around 55%.  Normal right ventricle size and systolic function.  Top normal right atrial cavity size, normal left atrial cavity size.  Aortic valve sclerosis with some focal calcification.  No aortic valve stenosis or regurgitation.  Mitral valve sclerosis, trace mitral valve regurgitation.  Trace tricuspid valve regurgitation.  Obvious pulmonary hypertension.  No pericardial effusion.    ECG 7/13/22: Sinus bradycardia, rate 58 bpm.     Lipid panel 4/18/2022: C 134. T 128. H 48. L 60.      Echocardiogram  2/10/2021:   EF 60%.  Mild diastolic dysfunction.   No significant valve abnormalities.     EKG 01/13/2021: Normal sinus rhythm.  Nonspecific ST abnormality.     Review of Systems   Constitutional: Negative.   HENT: Negative.     Cardiovascular:  Negative for chest pain, dyspnea on exertion, irregular heartbeat, leg swelling, near-syncope, orthopnea and palpitations.   Respiratory:  Negative for cough and snoring.    Endocrine: Negative.    Skin: Negative.    Musculoskeletal: Negative.    Gastrointestinal: Negative.    Genitourinary: Negative.    Neurological: Negative.    Psychiatric/Behavioral: Negative.         Vitals:    08/29/24 1429   BP: 122/64   Pulse: 62   Temp: (!) 97 °F (36.1 °C)   SpO2: 99%     Vitals:    08/29/24 1429   Weight: 94.2 kg (207 lb 9.6 oz)     Height: 6' (182.9 cm)   Body mass index is 28.16 kg/m².    Physical Exam  Vitals and nursing note reviewed.   Constitutional:       General: He is not in acute distress.     Appearance: He is well-developed. He is not diaphoretic.   HENT:      Head: Normocephalic and atraumatic.   Neck:      Vascular: No carotid bruit or JVD.   Cardiovascular:      Rate and Rhythm: Normal rate and regular rhythm.      Pulses: Intact distal pulses.      Heart sounds: Normal heart sounds, S1 normal and S2 normal. No murmur heard.     No friction rub. No gallop.      Comments: Trivial left ankle edema only, no right ankle edema  Pulmonary:      Effort: Pulmonary effort is normal. No respiratory distress.      Breath sounds: Normal breath sounds.   Abdominal:      General: There is no distension.      Palpations: Abdomen is soft.      Tenderness: There is no abdominal tenderness.   Skin:     General: Skin is warm and dry.      Findings: No rash.   Neurological:      Mental Status: He is alert and oriented to person, place, and time.   Psychiatric:         Mood and Affect: Mood and affect normal.         Behavior: Behavior normal.

## 2024-08-29 ENCOUNTER — OFFICE VISIT (OUTPATIENT)
Dept: CARDIOLOGY CLINIC | Facility: CLINIC | Age: 74
End: 2024-08-29
Payer: MEDICARE

## 2024-08-29 VITALS
TEMPERATURE: 97 F | HEART RATE: 62 BPM | BODY MASS INDEX: 28.12 KG/M2 | WEIGHT: 207.6 LBS | OXYGEN SATURATION: 99 % | HEIGHT: 72 IN | DIASTOLIC BLOOD PRESSURE: 64 MMHG | SYSTOLIC BLOOD PRESSURE: 122 MMHG

## 2024-08-29 DIAGNOSIS — Z95.5 S/P CORONARY ARTERY STENT PLACEMENT: ICD-10-CM

## 2024-08-29 DIAGNOSIS — R60.0 LOCALIZED EDEMA: ICD-10-CM

## 2024-08-29 DIAGNOSIS — I10 PRIMARY HYPERTENSION: ICD-10-CM

## 2024-08-29 DIAGNOSIS — I25.10 ATHEROSCLEROSIS OF NATIVE CORONARY ARTERY OF NATIVE HEART WITHOUT ANGINA PECTORIS: Primary | ICD-10-CM

## 2024-08-29 DIAGNOSIS — R00.1 BRADYCARDIA: ICD-10-CM

## 2024-08-29 DIAGNOSIS — R55 NEAR SYNCOPE: ICD-10-CM

## 2024-08-29 DIAGNOSIS — E78.2 MIXED HYPERLIPIDEMIA: ICD-10-CM

## 2024-08-29 PROBLEM — Z01.810 PREOP CARDIOVASCULAR EXAM: Status: RESOLVED | Noted: 2022-07-18 | Resolved: 2024-08-29

## 2024-08-29 PROCEDURE — 99214 OFFICE O/P EST MOD 30 MIN: CPT | Performed by: NURSE PRACTITIONER

## 2024-08-29 PROCEDURE — 93000 ELECTROCARDIOGRAM COMPLETE: CPT | Performed by: NURSE PRACTITIONER

## 2024-08-29 RX ORDER — MULTIVITAMIN
1 TABLET ORAL DAILY
COMMUNITY

## 2024-08-29 NOTE — ASSESSMENT & PLAN NOTE
Trivial left ankle edema most consistent with venous insufficiency.  Encouraged use of compression socks.  No symptoms of concern at this time

## 2024-08-29 NOTE — ASSESSMENT & PLAN NOTE
Coronary Artery Disease:   A. Abnormal stress echocardiogram 11/12/2020 with LAD wall motion abnormality.   B. Cardiac catheterization 11/19/2020 showed a 99% stenosed mid LAD lesion which was stented with BLANCA.   C. Residual 50% LCx(d) and 50% RtPDA disease.   - - - - - - - -  Patient is doing well post stenting.   He denies any recurrent chest pain.   He completed 1 year of DAPT with aspirin and clopidogrel.   Continue aspirin 81 mg daily lifelong.  Echocardiogram and nuclear stress testing 9/2023 showed no concerning findings.  Continue metoprolol succinate 12.5 mg daily, losartan 25 mg daily  Continue atorvastatin 40 mg daily for goal LDL < 70.

## 2024-08-29 NOTE — ASSESSMENT & PLAN NOTE
Patient previously noted intermittent episodes of bradycardia with heart rates in the 40s at home.  He also noted associated near-syncope with some of these episodes.  ZIO 3/2021 showed an average heart rate of 64 bpm (other details as outlined in results).  Metoprolol succinate was decreased to 12.5 mg daily.  ECG today shows SB at 54 bpm.  He is asymptomatic at this time. Will monitor.

## 2024-08-29 NOTE — PATIENT INSTRUCTIONS
Keep up the good work.  EKG looks good.  You are up to date with cardiac testing.  We will watch for your labs through primary care later this fall.  Any concerns please call.

## 2024-08-29 NOTE — ASSESSMENT & PLAN NOTE
Blood pressure is excellent.  Continue losartan 25 mg daily and metoprolol succinate 12.5 mg daily

## 2025-02-07 DIAGNOSIS — Z95.5 S/P CORONARY ARTERY STENT PLACEMENT: ICD-10-CM

## 2025-02-07 RX ORDER — METOPROLOL SUCCINATE 25 MG/1
12.5 TABLET, EXTENDED RELEASE ORAL
Qty: 45 TABLET | Refills: 1 | Status: SHIPPED | OUTPATIENT
Start: 2025-02-07

## 2025-03-07 ENCOUNTER — OFFICE VISIT (OUTPATIENT)
Dept: CARDIOLOGY CLINIC | Facility: CLINIC | Age: 75
End: 2025-03-07
Payer: MEDICARE

## 2025-03-07 VITALS
BODY MASS INDEX: 28.44 KG/M2 | WEIGHT: 210 LBS | SYSTOLIC BLOOD PRESSURE: 132 MMHG | HEART RATE: 60 BPM | OXYGEN SATURATION: 99 % | TEMPERATURE: 97.6 F | DIASTOLIC BLOOD PRESSURE: 88 MMHG | HEIGHT: 72 IN

## 2025-03-07 DIAGNOSIS — E78.2 MIXED HYPERLIPIDEMIA: ICD-10-CM

## 2025-03-07 DIAGNOSIS — N18.31 STAGE 3A CHRONIC KIDNEY DISEASE (HCC): ICD-10-CM

## 2025-03-07 DIAGNOSIS — I25.10 ATHEROSCLEROSIS OF NATIVE CORONARY ARTERY OF NATIVE HEART WITHOUT ANGINA PECTORIS: Primary | ICD-10-CM

## 2025-03-07 DIAGNOSIS — R00.1 BRADYCARDIA: ICD-10-CM

## 2025-03-07 DIAGNOSIS — I10 PRIMARY HYPERTENSION: ICD-10-CM

## 2025-03-07 PROCEDURE — 99214 OFFICE O/P EST MOD 30 MIN: CPT | Performed by: NURSE PRACTITIONER

## 2025-03-07 RX ORDER — LOSARTAN POTASSIUM 25 MG/1
25 TABLET ORAL DAILY
Qty: 90 TABLET | Refills: 4 | Status: SHIPPED | OUTPATIENT
Start: 2025-03-07

## 2025-03-07 RX ORDER — NITROGLYCERIN 0.4 MG/1
0.4 TABLET SUBLINGUAL
Qty: 15 TABLET | Refills: 3 | Status: SHIPPED | OUTPATIENT
Start: 2025-03-07

## 2025-03-07 NOTE — PATIENT INSTRUCTIONS
Continue current medications.  Track BP and notify me if staying around 140/80  Nitro bottle is good til August 2025, I did put refills to the pharmacy  Contact us with any concerns.

## 2025-03-08 NOTE — ASSESSMENT & PLAN NOTE
Blood pressure is excellent.  Continue losartan 25 mg daily and metoprolol succinate 12.5 mg daily  Recent lab work reviewed

## 2025-03-08 NOTE — ASSESSMENT & PLAN NOTE
Lab Results   Component Value Date    EGFR 64 11/20/2024    EGFR 84 11/20/2023    EGFR 71 12/19/2022    CREATININE 1.2 11/20/2024    CREATININE 1.0 11/20/2023    CREATININE 1.05 12/19/2022     Stable on recent labs.  Monitoring with use of losartan.  BP at goal

## 2025-03-08 NOTE — PROGRESS NOTES
Cardiology Follow Up    Long Agosto  1950  [id number]  [facility] [place] [address]  [address]  [facility] [address] [phone]    Nile presents for routine follow up of CAD, HTN, HLD.     1. Atherosclerosis of native coronary artery of native heart without angina pectoris  Assessment & Plan:  Coronary Artery Disease:   A. Abnormal stress echocardiogram 11/12/2020 with LAD wall motion abnormality.   B. Cardiac catheterization 11/19/2020 showed a 99% stenosed mid LAD lesion which was stented with BLANCA.   C. Residual 50% LCx(d) and 50% RtPDA disease.   - - - - - - - -  Patient is doing well post stenting.   He denies any recurrent chest pain.   He completed 1 year of DAPT with aspirin and clopidogrel.   Continue aspirin 81 mg daily lifelong.  Echocardiogram and nuclear stress testing 9/2023 showed no concerning findings.  Continue metoprolol succinate 12.5 mg daily, losartan 25 mg daily  Continue atorvastatin 40 mg daily for goal LDL < 70.   Orders:  -     nitroglycerin (NITROSTAT) 0.4 mg SL tablet; Place 1 tablet (0.4 mg total) under the tongue every 5 (five) minutes as needed for chest pain  2. Primary hypertension  Assessment & Plan:  Blood pressure is excellent.  Continue losartan 25 mg daily and metoprolol succinate 12.5 mg daily  Recent lab work reviewed  Orders:  -     losartan (COZAAR) 25 mg tablet; Take 1 tablet (25 mg total) by mouth daily  3. Mixed hyperlipidemia  Assessment & Plan:  Lipid panel 11/2024 with LDL 47  Continue atorvastatin 40 mg daily.  Goal LDL < 70.    4. Bradycardia  Assessment & Plan:  Patient previously noted intermittent episodes of bradycardia with heart rates in the 40s at home.  He also noted associated near-syncope with some of these episodes.  ZIO 3/2021 showed an average heart rate of 64 bpm (other details as outlined in results).  Metoprolol succinate was decreased to 12.5 mg daily.  Heart rates remain  acceptable.  He is asymptomatic at this time. Will monitor.  5. Stage 3a chronic kidney disease (HCC)  Assessment & Plan:  Lab Results   Component Value Date    EGFR 64 11/20/2024    EGFR 84 11/20/2023    EGFR 71 12/19/2022    CREATININE 1.2 11/20/2024    CREATININE 1.0 11/20/2023    CREATININE 1.05 12/19/2022     Stable on recent labs.  Monitoring with use of losartan.  BP at goal     HPI  Nile has a past medical history of CAD s/p BLANCA 11/19/2020, HLD, depression, BPH, and left inguinal hernia.     He initially established with [facility] after undergoing an abnormal stress echocardiogram on 11/12/2020 for symptoms of chest pain radiating to his jaw with exertion.  He was sent for elective cardiac catheterization performed at [facility] on 11/19/2020 with full details below.     He has done well post PCI. His metoprolol succinate was reduced to 12.5mg in the spring due to c/o lightheadedness with resolution of symptoms. Losartan 25 mg daily was added in 12/2022 for BP optimization.     He followed up with Dr. Sharmin Toscano at our office in September 2023 at which time he reported transient exertional chest discomfort. An updated stress test and echo were ordered.   Lexiscan nuclear stress test 9/26/23 was negative for scar or ischemia.   Echo 9/26/2024 showed preserved LVEF with no obvious regional wall motion abnormality or valvular disease.    3/7/2025: Nile presents for routine follow up accompanied by his wife. He reports that he is doing well from a cardiac standpoint. No chest pain, shortness of breath, lightheadedness, or unusual fatigue. HR's remain acceptable. No exertional complaints. He plans to start walking again as the weather gets better.     Medical Problems       Problem List       Atherosclerosis of native coronary artery of native heart without angina pectoris    S/P coronary artery stent placement    Mixed hyperlipidemia    Bradycardia    Near syncope    Primary hypertension    Localized  edema    History of PTCA    Stage 3a chronic kidney disease (HCC)        Past Medical History:   Diagnosis Date    Abnormal stress test     Coronary artery disease     BLANCA to LAD(m) 11/19/2020.    Depression     Dyslipidemia 11/19/2020    Inguinal hernia     Left      Social History     Socioeconomic History    Marital status: /Civil Union     Spouse name: Not on file    Number of children: Not on file    Years of education: Not on file    Highest education level: Not on file   Occupational History    Occupation: Retired   Tobacco Use    Smoking status: Never    Smokeless tobacco: Never   Vaping Use    Vaping status: Never Used   Substance and Sexual Activity    Alcohol use: Yes     Alcohol/week: 2.0 standard drinks of alcohol     Types: 2 Glasses of wine per week     Comment: socially     Drug use: Never    Sexual activity: Yes     Partners: Female   Other Topics Concern    Not on file   Social History Narrative    Not on file     Social Drivers of Health     Financial Resource Strain: Low Risk  (11/20/2023)    Received from [employer]    Financial Resource Strain     Do you have any trouble paying for your medications, or do you think you might in the future?: No     Does your family have trouble paying for medicine? (Household - for ages 0-17 years): Not on file   Food Insecurity: No Food Insecurity (11/20/2023)    Received from [employer]    Hunger Vital Sign     Worried About Running Out of Food in the Last Year: Never true     Ran Out of Food in the Last Year: Never true   Transportation Needs: No Transportation Needs (11/20/2023)    Received from [employer]    Transportation Needs     READ ONLY Do you have trouble getting a ride to medical visits or work?: Never True     Does your family have a hard time getting a ride to doctors’ visits? (Household - for ages 0-17 years): Not on file     Has lack of transportation kept you from medical appointments, meetings, work, or from getting things  needed for daily living? Check all that apply. (Adult - for ages 18 years and over): Not on file     Do you (or your family) have trouble finding or paying for a ride (transportation)? (Household - for ages 0-17 years): Not on file   Physical Activity: Not on file   Stress: Not on file   Social Connections: Socially Integrated (2023)    Received from [employer]     How often do you feel lonely or isolated from those around you?: Never   Intimate Partner Violence: Not on file   Housing Stability: Low Risk  (2023)    Received from [employer]    Housing Stability     Do you currently live in a shelter or have no steady place to sleep at night?: No     READ ONLY Do you think you are at risk of becoming homeless?: No     Does your family worry about paying for your home or becoming homeless? (Household - for ages 0-17 years): Not on file     Are you homeless or worried that you might be in the future? (Adult - for ages 18 years and over): Not on file     Are you (or your family) homeless or worried that you might be in the future? (Household - for ages 0-17 years): Not on file      Family History   Problem Relation Age of Onset    Breast cancer Mother          at age 72    No Known Problems Sister     No Known Problems Brother     No Known Problems Brother     No Known Problems Brother      Past Surgical History:   Procedure Laterality Date    CARDIAC CATHETERIZATION  2020    99% LAD(m) after D1. Residual 50% LCx(d) and 50% RtPDA.     CORONARY ANGIOPLASTY WITH STENT PLACEMENT  2020    BLANCA to LAD(m).    HERNIA REPAIR      LUMBAR DISC SURGERY      SC LAPAROSCOPY SURG RPR INITIAL INGUINAL HERNIA Left 2021    Procedure: ROBOTIC ASSISTED LAPAROSCOPIC INGUINAL HERNIA REPAIR WITH MESH;  Surgeon: Dannielle Rodriguez DO;  Location:  MAIN OR;  Service: General    PROSTATE SURGERY      laser core    WRIST SURGERY Right        Current Outpatient Medications:     aspirin  (ECOTRIN LOW STRENGTH) 81 mg EC tablet, Take 81 mg by mouth daily Pt was to continue taking d/t heart stents, Disp: , Rfl:     atorvastatin (LIPITOR) 40 mg tablet, Take 1 tablet (40 mg total) by mouth daily with dinner, Disp: 90 tablet, Rfl: 3    losartan (COZAAR) 25 mg tablet, Take 1 tablet (25 mg total) by mouth daily, Disp: 90 tablet, Rfl: 4    metoprolol succinate (TOPROL-XL) 25 mg 24 hr tablet, TAKE 1/2 TABLET (12.5 MG TOTAL) BY MOUTH DAILY AT BEDTIME, Disp: 45 tablet, Rfl: 1    Multiple Vitamin (multivitamin) tablet, Take 1 tablet by mouth daily, Disp: , Rfl:     nitroglycerin (NITROSTAT) 0.4 mg SL tablet, Place 1 tablet (0.4 mg total) under the tongue every 5 (five) minutes as needed for chest pain, Disp: 15 tablet, Rfl: 3    Omega-3 Fatty Acids (fish oil) 1,000 mg, Fish Oil, Disp: , Rfl:   Allergies   Allergen Reactions    Pantoprazole Diarrhea and Nausea Only    Amoxicillin Rash       Labs:     Chemistry        Component Value Date/Time    K 4.9 11/20/2024 0902     11/20/2024 0902    CO2 26 11/20/2024 0902    BUN 19 11/20/2024 0902    BUN 21 07/29/2022 1106    CREATININE 1.2 11/20/2024 0902        Component Value Date/Time    CALCIUM 9.8 11/20/2024 0902    ALKPHOS 67 11/20/2024 0902    AST 21 11/20/2024 0902    ALT 23 11/20/2024 0902        Cardiac Test Results:   Lipid panel 11/20/2024: C 121. T 110. H 52. L 47.    ECG 8/29/2024: Sinus bradycardia at 54 bpm. Otherwise normal ECG.     Lexiscan 9/26/2024:    Stress ECG: Up sloping ST depression of 0.5 mm in the inferolateral leads (II, III, aVF, V5 and V6) is noted. The ECG was negative for ischemia.  There were nondiagnostic ST-T wave abnormalities. The stress ECG is negative for ischemia after maximal exercise, without reproduction of symptoms.  There was increasing frequency of ventricular ectopy with exercise and during recovery.    Perfusion: There is a left ventricular perfusion defect that is small to medium in size present in the basal inferior  and inferoseptal location(s) that is paradoxical.    Stress Function: Left ventricular function post-stress is normal.  Although the calculated ejection fraction was 47% it is estimated as around 65% by visual assessment.  Normal regional wall motion was noted.    Stress Combined Conclusion: The ECG and SPECT imaging portions of the stress study are concordant with no evidence of stress induced myocardial ischemia. There is image artifact, without diagnostic evidence for perfusion abnormality.    Arrhythmias: Resting ECG showed occasional premature ventricular contractions.  There was occurance of occasional ventricular couplets ventricular triplets and rare premature atrial contractions with exercise and into recovery.  There was increase in ventricular ectopy with exercise.    No chest pain was reported with exercise.    Elevated resting blood pressure with appropriate blood pressure response to exercise.     Echo 9/26/2024:  Borderline increased left ventricular thickness, normal left ventricle systolic function, grade 1 diastolic dysfunction.  Left ventricular ejection fraction is estimated as around 55%.  Normal right ventricle size and systolic function.  Top normal right atrial cavity size, normal left atrial cavity size.  Aortic valve sclerosis with some focal calcification.  No aortic valve stenosis or regurgitation.  Mitral valve sclerosis, trace mitral valve regurgitation.  Trace tricuspid valve regurgitation.  Obvious pulmonary hypertension.  No pericardial effusion.     ECG 7/13/22: Sinus bradycardia, rate 58 bpm.     Lipid panel 4/18/2022: C 134. T 128. H 48. L 60.      Echocardiogram 2/10/2021:   EF 60%.  Mild diastolic dysfunction.   No significant valve abnormalities.     EKG 01/13/2021: Normal sinus rhythm.  Nonspecific ST abnormality.    ROS    Vitals:    03/07/25 1040   BP: 132/88   Pulse: 60   Temp: 97.6 °F (36.4 °C)   SpO2: 99%     Vitals:    03/07/25 1040   Weight: 95.3 kg (210 lb)     Height:  6' (182.9 cm)   Body mass index is 28.48 kg/m².    Physical Exam

## 2025-03-08 NOTE — ASSESSMENT & PLAN NOTE
Patient previously noted intermittent episodes of bradycardia with heart rates in the 40s at home.  He also noted associated near-syncope with some of these episodes.  ZIO 3/2021 showed an average heart rate of 64 bpm (other details as outlined in results).  Metoprolol succinate was decreased to 12.5 mg daily.  Heart rates remain acceptable.  He is asymptomatic at this time. Will monitor.

## 2025-05-26 DIAGNOSIS — Z95.5 S/P CORONARY ARTERY STENT PLACEMENT: ICD-10-CM

## 2025-05-27 RX ORDER — ATORVASTATIN CALCIUM 40 MG/1
40 TABLET, FILM COATED ORAL
Qty: 90 TABLET | Refills: 0 | Status: SHIPPED | OUTPATIENT
Start: 2025-05-27

## 2025-08-18 DIAGNOSIS — Z95.5 S/P CORONARY ARTERY STENT PLACEMENT: ICD-10-CM

## 2025-08-19 RX ORDER — METOPROLOL SUCCINATE 25 MG/1
12.5 TABLET, EXTENDED RELEASE ORAL
Qty: 45 TABLET | Refills: 4 | Status: SHIPPED | OUTPATIENT
Start: 2025-08-19

## (undated) DEVICE — BIPOLAR CORD DISP

## (undated) DEVICE — ELECTRO LUBE IS A SINGLE PATIENT USE DEVICE THAT IS INTENDED TO BE USED ON ELECTROSURGICAL ELECTRODES TO REDUCE STICKING.: Brand: KEY SURGICAL ELECTRO LUBE

## (undated) DEVICE — MEDI-VAC YANKAUER SUCTION HANDLE W/STRAIGHT TIP & CONTROL VENT: Brand: CARDINAL HEALTH

## (undated) DEVICE — KIT ROBOT DRAPE DISP ACCESSORY KIT 3-ARM

## (undated) DEVICE — SUT VICRYL 0 UR-6 27 IN J603H

## (undated) DEVICE — 40595 XL TRENDELENBURG POSITIONING KIT: Brand: 40595 XL TRENDELENBURG POSITIONING KIT

## (undated) DEVICE — SUT VICRYL 4-0 PS-2 27 IN J426H

## (undated) DEVICE — SUT VICRYL 4-0 PS-2 18 IN J496G

## (undated) DEVICE — CHLORAPREP HI-LITE 26ML ORANGE

## (undated) DEVICE — ALLENTOWN LAP CHOLE APP PACK: Brand: CARDINAL HEALTH

## (undated) DEVICE — SYRINGE 10ML LL

## (undated) DEVICE — SUT VLOC 90 2-0 GS-22 12 IN VLOCM2115

## (undated) DEVICE — TUBING SMOKE EVAC W/FILTRATION DEVICE PLUMEPORT ACTIV

## (undated) DEVICE — MONOPOLAR CURVED SCISSORS: Brand: ENDOWRIST;DAVINCI SI

## (undated) DEVICE — GLOVE INDICATOR PI UNDERGLOVE SZ 6.5 BLUE

## (undated) DEVICE — NEEDLE 25G X 1 1/2

## (undated) DEVICE — PAD GROUNDING ADULT

## (undated) DEVICE — SCD SEQUENTIAL COMPRESSION COMFORT SLEEVE MEDIUM KNEE LENGTH: Brand: KENDALL SCD

## (undated) DEVICE — DRAPE SHEET THREE QUARTER

## (undated) DEVICE — STERILE LATEX POWDER-FREE SURGICAL GLOVESWITH NITRILE COATING: Brand: PROTEXIS

## (undated) DEVICE — TROCAR: Brand: KII FIOS FIRST ENTRY

## (undated) DEVICE — PENCIL ELECTROSURG E-Z CLEAN -0035H

## (undated) DEVICE — ASTOUND FABRIC REINFORCED SURGICAL GOWN: Brand: CONVERTORS

## (undated) DEVICE — ROBOT INST CANNULA 8MM OBTURATOR BLUNT DISP

## (undated) DEVICE — MEGASUTURECUT ND: Brand: ENDOWRIST;DAVINCI SI

## (undated) DEVICE — TIP COVER ACCESSORY

## (undated) DEVICE — TUBING INSUFFLATION SET ISO CONNECTOR

## (undated) DEVICE — INTENDED FOR TISSUE SEPARATION, AND OTHER PROCEDURES THAT REQUIRE A SHARP SURGICAL BLADE TO PUNCTURE OR CUT.: Brand: BARD-PARKER SAFETY BLADES SIZE 11, STERILE

## (undated) DEVICE — VIAL DECANTER

## (undated) DEVICE — ENDOPATH PNEUMONEEDLE INSUFFLATION NEEDLES WITH LUER LOCK CONNECTORS 120MM: Brand: ENDOPATH

## (undated) DEVICE — DRAPE EQUIPMENT RF WAND

## (undated) DEVICE — FENESTRATED BIPOLAR FORCEPS: Brand: ENDOWRIST;DAVINCI SI

## (undated) DEVICE — SUT VICRYL 2-0 CT-2 27 IN J269H

## (undated) DEVICE — ADHESIVE SKIN HIGH VISCOSITY EXOFIN 1ML